# Patient Record
Sex: FEMALE | Race: WHITE | NOT HISPANIC OR LATINO | Employment: FULL TIME | URBAN - METROPOLITAN AREA
[De-identification: names, ages, dates, MRNs, and addresses within clinical notes are randomized per-mention and may not be internally consistent; named-entity substitution may affect disease eponyms.]

---

## 2017-04-28 ENCOUNTER — HISTORICAL (OUTPATIENT)
Dept: ADMINISTRATIVE | Facility: HOSPITAL | Age: 62
End: 2017-04-28

## 2017-06-02 RX ORDER — AMLODIPINE BESYLATE 2.5 MG/1
1 TABLET ORAL DAILY
COMMUNITY
Start: 2017-04-19 | End: 2017-09-18 | Stop reason: SDUPTHER

## 2017-06-02 RX ORDER — DICYCLOMINE HYDROCHLORIDE 20 MG/1
1 TABLET ORAL DAILY
COMMUNITY
End: 2018-02-06

## 2017-06-02 RX ORDER — UBIDECARENONE 60 MG
1 CAPSULE ORAL DAILY
COMMUNITY
End: 2019-05-21 | Stop reason: CLARIF

## 2017-06-02 RX ORDER — BISOPROLOL FUMARATE 10 MG/1
1 TABLET, FILM COATED ORAL DAILY
COMMUNITY
Start: 2017-04-19 | End: 2017-07-13 | Stop reason: SDUPTHER

## 2017-06-02 RX ORDER — METHOCARBAMOL 750 MG/1
1 TABLET, FILM COATED ORAL
Status: ON HOLD | COMMUNITY
End: 2017-08-22 | Stop reason: CLARIF

## 2017-06-02 RX ORDER — LEVOTHYROXINE SODIUM 112 UG/1
1 TABLET ORAL DAILY
COMMUNITY
Start: 2017-01-30 | End: 2017-07-13 | Stop reason: SDUPTHER

## 2017-06-05 ENCOUNTER — OFFICE VISIT (OUTPATIENT)
Dept: FAMILY MEDICINE | Facility: CLINIC | Age: 62
End: 2017-06-05
Payer: COMMERCIAL

## 2017-06-05 VITALS
WEIGHT: 148 LBS | DIASTOLIC BLOOD PRESSURE: 84 MMHG | SYSTOLIC BLOOD PRESSURE: 122 MMHG | OXYGEN SATURATION: 99 % | HEIGHT: 62 IN | HEART RATE: 56 BPM | BODY MASS INDEX: 27.23 KG/M2

## 2017-06-05 DIAGNOSIS — E03.9 ACQUIRED HYPOTHYROIDISM: ICD-10-CM

## 2017-06-05 DIAGNOSIS — M54.2 NECK PAIN: ICD-10-CM

## 2017-06-05 DIAGNOSIS — I10 BENIGN HYPERTENSION: Primary | ICD-10-CM

## 2017-06-05 PROBLEM — K58.9 IRRITABLE BOWEL SYNDROME: Status: ACTIVE | Noted: 2017-06-05

## 2017-06-05 PROBLEM — E78.2 MULTIPLE-TYPE HYPERLIPIDEMIA: Status: ACTIVE | Noted: 2017-06-05

## 2017-06-05 PROBLEM — J30.9 ALLERGIC RHINITIS: Status: ACTIVE | Noted: 2017-06-05

## 2017-06-05 PROBLEM — G47.00 INSOMNIA, PERSISTENT: Status: ACTIVE | Noted: 2017-06-05

## 2017-06-05 PROCEDURE — 99214 OFFICE O/P EST MOD 30 MIN: CPT | Mod: ,,, | Performed by: FAMILY MEDICINE

## 2017-06-05 NOTE — PROGRESS NOTES
Subjective:       Patient ID:  Yudith Haider is a 61 y.o. female with multiple medical diagnoses as listed in the medical history and problem list that presents for Hypertension (f/u) and Neck Pain (f/u)  .      Chief Complaint: Hypertension (f/u) and Neck Pain (f/u)    Hypertension   This is a chronic problem. The current episode started more than 1 year ago. The problem is unchanged. The problem is controlled. Associated symptoms include neck pain. Pertinent negatives include no chest pain, headaches or shortness of breath. Risk factors for coronary artery disease include diabetes mellitus. Past treatments include calcium channel blockers and beta blockers. The current treatment provides moderate improvement. There are no compliance problems.  There is no history of angina, PVD or retinopathy.   Neck Pain    This is a chronic problem. The current episode started more than 1 year ago. The problem occurs constantly. The problem has been gradually improving. The pain is associated with nothing. The pain is present in the midline. The pain is at a severity of 2/10. The pain is moderate. The symptoms are aggravated by twisting and position. Pertinent negatives include no chest pain, fever or headaches. She has tried NSAIDs and home exercises (physical therapy) for the symptoms.     Review of Systems   Constitutional: Negative for activity change, appetite change, chills and fever.   HENT: Negative.  Negative for facial swelling.    Eyes: Negative.  Negative for discharge.   Respiratory: Negative for apnea, cough and shortness of breath.    Cardiovascular: Negative.  Negative for chest pain.   Gastrointestinal: Negative for abdominal distention, diarrhea, nausea and vomiting.   Genitourinary: Negative for difficulty urinating and dyspareunia.   Musculoskeletal: Positive for neck pain. Negative for arthralgias (right keen and back pain. ), back pain and myalgias.   Skin: Negative for color change and rash.    Neurological: Negative.  Negative for dizziness and headaches.   Psychiatric/Behavioral: Negative for agitation and dysphoric mood.       Past Medical History:   Diagnosis Date    Hyperlipidemia     Hypertension     Hypothyroidism      Past Surgical History:   Procedure Laterality Date    HYSTERECTOMY  1981    THYROIDECTOMY  1993     Family History   Problem Relation Age of Onset    Hypertension Mother     Diabetes Mother     Stroke Father     Hypertension Father      Social History     Social History    Marital status:      Spouse name: N/A    Number of children: N/A    Years of education: N/A     Social History Main Topics    Smoking status: Former Smoker     Types: Cigarettes     Quit date: 1/1/2003    Smokeless tobacco: None    Alcohol use Yes      Comment: occassional    Drug use: No    Sexual activity: Not Asked     Other Topics Concern    None     Social History Narrative    None     Current Outpatient Prescriptions   Medication Sig Dispense Refill    amlodipine (NORVASC) 2.5 MG tablet Take 1 tablet by mouth Daily.      bisoprolol (ZEBETA) 10 MG tablet Take 1 tablet by mouth Daily.      COCONUT OIL ORAL Take 1 tablet by mouth Daily.      dicyclomine (BENTYL) 20 mg tablet Take 1 tablet by mouth Daily.      krill-om-3-dha-epa-phospho-ast (KRILL OIL) 1,006-012-26-50 mg Cap Take 1 tablet by mouth Daily.      levothyroxine (SYNTHROID) 112 MCG tablet Take 1 tablet by mouth Daily.      methocarbamol (ROBAXIN-750) 750 MG Tab Take 1 tablet by mouth as needed.       UBIDECARENONE (COENZYME Q10) 60 mg Cap Take 1 capsule by mouth Daily.       No current facility-administered medications for this visit.      Review of patient's allergies indicates:   Allergen Reactions    Lisinopril      Other reaction(s): Cough     Objective:      Vitals:    06/05/17 0915   BP: 122/84   Pulse: (!) 56     Physical Exam   Constitutional: She appears well-developed and well-nourished.   HENT:   Head:  Normocephalic.   Eyes: Pupils are equal, round, and reactive to light. Right eye exhibits no discharge. Left eye exhibits no discharge. No scleral icterus.   Neck: Muscular tenderness present. No thyromegaly present.   Cardiovascular: Normal rate and regular rhythm.  Exam reveals no gallop and no friction rub.    No murmur heard.  Pulmonary/Chest: No respiratory distress. She has no wheezes. She has no rales. She exhibits no tenderness.   Abdominal: She exhibits no distension. There is no tenderness.   Musculoskeletal: She exhibits no tenderness.   Skin: No erythema.   Psychiatric: She has a normal mood and affect.       Assessment:       1. Benign hypertension    2. Acquired hypothyroidism    3. Neck pain        Plan:       Benign hypertension  Advise pt to take low sodium, healthier diet,  exercise 150 min per week,   compliance with medication. Maintain the ideal body weight or  weight loss if overweight/ obesity.    Acquired hypothyroidism  Comments:  TSH wnl     Neck pain  Comments:  5/2017: continue PT.       Return in about 4 months (around 10/5/2017) for annual physical.      6/5/2017 Gila Brown M.D.

## 2017-06-20 DIAGNOSIS — M54.2 NECK PAIN: Primary | ICD-10-CM

## 2017-07-05 ENCOUNTER — OFFICE VISIT (OUTPATIENT)
Dept: PAIN MEDICINE | Facility: CLINIC | Age: 62
End: 2017-07-05
Payer: COMMERCIAL

## 2017-07-05 VITALS
DIASTOLIC BLOOD PRESSURE: 89 MMHG | WEIGHT: 148 LBS | HEIGHT: 62 IN | HEART RATE: 89 BPM | SYSTOLIC BLOOD PRESSURE: 138 MMHG | BODY MASS INDEX: 27.23 KG/M2

## 2017-07-05 DIAGNOSIS — M54.12 CERVICAL RADICULOPATHY: Primary | ICD-10-CM

## 2017-07-05 DIAGNOSIS — M50.30 DDD (DEGENERATIVE DISC DISEASE), CERVICAL: Primary | ICD-10-CM

## 2017-07-05 DIAGNOSIS — M47.812 OSTEOARTHRITIS OF CERVICAL SPINE, UNSPECIFIED SPINAL OSTEOARTHRITIS COMPLICATION STATUS: ICD-10-CM

## 2017-07-05 PROCEDURE — 99999 PR PBB SHADOW E&M-NEW PATIENT-LVL III: CPT | Mod: PBBFAC,,, | Performed by: ANESTHESIOLOGY

## 2017-07-05 PROCEDURE — 99244 OFF/OP CNSLTJ NEW/EST MOD 40: CPT | Mod: S$GLB,,, | Performed by: ANESTHESIOLOGY

## 2017-07-05 NOTE — LETTER
July 5, 2017      Gila Brown MD  [address]  [place] [address]           [place] [facility]  [address] [place] [address]  [address]  [place] [address]  Phone: [phone]          Patient: Yudith Haider   MR Number: [id number]   YOB: 1955   Date of Visit: 7/5/2017       Dear Dr. Gila Brown:    Thank you for referring Yudith Haider to me for evaluation. Attached you will find relevant portions of my assessment and plan of care.    If you have questions, please do not hesitate to call me. I look forward to following Yudith Haider along with you.    Sincerely,    Jose Bennett MD    Enclosure  CC:  No Recipients    If you would like to receive this communication electronically, please contact [email or web address] or [phone] to request more information on [facility] access.    For providers and/or their staff who would like to refer a patient to [facility], please contact us through our one-stop-shop provider referral line, [facility], at [phone].    If you feel you have received this communication in error or would no longer like to receive these types of communications, please e-mail [email or web address]
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
## 2017-07-06 ENCOUNTER — DOCUMENTATION ONLY (OUTPATIENT)
Dept: PAIN MEDICINE | Facility: CLINIC | Age: 62
End: 2017-07-06

## 2017-07-06 NOTE — PROGRESS NOTES
(Open sided MRI) 3/23/2017    MRI of the cervical spine   Reversal of usual cervical lordosis  Multilevel mild to moderate facet arthropathy.  C3-4 through C6-7 level degenerative cervical disc disease/spondylosis.  Most advanced at C5-6 and C6-7.  There is mild central canal stenosis and mild foraminal narrowing at those levels.

## 2017-07-13 RX ORDER — BISOPROLOL FUMARATE 10 MG/1
TABLET, FILM COATED ORAL
Qty: 90 TABLET | Refills: 1 | Status: SHIPPED | OUTPATIENT
Start: 2017-07-13 | End: 2017-09-18 | Stop reason: SDUPTHER

## 2017-07-13 RX ORDER — LEVOTHYROXINE SODIUM 112 UG/1
TABLET ORAL
Qty: 90 TABLET | Refills: 1 | Status: SHIPPED | OUTPATIENT
Start: 2017-07-13 | End: 2017-09-18 | Stop reason: SDUPTHER

## 2017-07-14 ENCOUNTER — SURGERY (OUTPATIENT)
Age: 62
End: 2017-07-14

## 2017-07-14 ENCOUNTER — HOSPITAL ENCOUNTER (OUTPATIENT)
Facility: AMBULARY SURGERY CENTER | Age: 62
Discharge: HOME | End: 2017-07-14
Attending: ANESTHESIOLOGY | Admitting: ANESTHESIOLOGY
Payer: COMMERCIAL

## 2017-07-14 DIAGNOSIS — M50.30 DDD (DEGENERATIVE DISC DISEASE), CERVICAL: Primary | ICD-10-CM

## 2017-07-14 PROCEDURE — 62321 NJX INTERLAMINAR CRV/THRC: CPT | Performed by: ANESTHESIOLOGY

## 2017-07-14 PROCEDURE — 62321 NJX INTERLAMINAR CRV/THRC: CPT | Mod: ,,, | Performed by: ANESTHESIOLOGY

## 2017-07-14 PROCEDURE — 99152 MOD SED SAME PHYS/QHP 5/>YRS: CPT | Mod: ,,, | Performed by: ANESTHESIOLOGY

## 2017-07-14 RX ORDER — MIDAZOLAM HYDROCHLORIDE 2 MG/2ML
INJECTION, SOLUTION INTRAMUSCULAR; INTRAVENOUS
Status: DISCONTINUED | OUTPATIENT
Start: 2017-07-14 | End: 2017-07-14 | Stop reason: HOSPADM

## 2017-07-14 RX ORDER — SODIUM CHLORIDE 9 MG/ML
INJECTION, SOLUTION INTRAMUSCULAR; INTRAVENOUS; SUBCUTANEOUS
Status: DISCONTINUED | OUTPATIENT
Start: 2017-07-14 | End: 2017-07-14 | Stop reason: HOSPADM

## 2017-07-14 RX ORDER — FENTANYL CITRATE 50 UG/ML
INJECTION, SOLUTION INTRAMUSCULAR; INTRAVENOUS
Status: DISCONTINUED | OUTPATIENT
Start: 2017-07-14 | End: 2017-07-14 | Stop reason: HOSPADM

## 2017-07-14 RX ORDER — FENTANYL CITRATE 50 UG/ML
INJECTION, SOLUTION INTRAMUSCULAR; INTRAVENOUS
Status: DISCONTINUED
Start: 2017-07-14 | End: 2017-07-14 | Stop reason: HOSPADM

## 2017-07-14 RX ORDER — LIDOCAINE HYDROCHLORIDE 10 MG/ML
INJECTION, SOLUTION EPIDURAL; INFILTRATION; INTRACAUDAL; PERINEURAL
Status: DISCONTINUED | OUTPATIENT
Start: 2017-07-14 | End: 2017-07-14 | Stop reason: HOSPADM

## 2017-07-14 RX ORDER — ALPRAZOLAM 1 MG/1
1 TABLET, ORALLY DISINTEGRATING ORAL ONCE AS NEEDED
Status: DISCONTINUED | OUTPATIENT
Start: 2017-07-14 | End: 2017-07-14 | Stop reason: HOSPADM

## 2017-07-14 RX ORDER — DEXAMETHASONE SODIUM PHOSPHATE 10 MG/ML
INJECTION INTRAMUSCULAR; INTRAVENOUS
Status: DISCONTINUED | OUTPATIENT
Start: 2017-07-14 | End: 2017-07-14 | Stop reason: HOSPADM

## 2017-07-14 RX ORDER — SODIUM CHLORIDE, SODIUM LACTATE, POTASSIUM CHLORIDE, CALCIUM CHLORIDE 600; 310; 30; 20 MG/100ML; MG/100ML; MG/100ML; MG/100ML
INJECTION, SOLUTION INTRAVENOUS CONTINUOUS
Status: DISCONTINUED | OUTPATIENT
Start: 2017-07-14 | End: 2017-07-14 | Stop reason: HOSPADM

## 2017-07-14 RX ORDER — MIDAZOLAM HYDROCHLORIDE 1 MG/ML
INJECTION INTRAMUSCULAR; INTRAVENOUS
Status: DISCONTINUED
Start: 2017-07-14 | End: 2017-07-14 | Stop reason: HOSPADM

## 2017-07-14 RX ORDER — DEXAMETHASONE SODIUM PHOSPHATE 10 MG/ML
INJECTION INTRAMUSCULAR; INTRAVENOUS
Status: DISCONTINUED
Start: 2017-07-14 | End: 2017-07-14 | Stop reason: HOSPADM

## 2017-07-14 RX ORDER — LIDOCAINE HYDROCHLORIDE 10 MG/ML
INJECTION, SOLUTION EPIDURAL; INFILTRATION; INTRACAUDAL; PERINEURAL
Status: DISCONTINUED
Start: 2017-07-14 | End: 2017-07-14 | Stop reason: HOSPADM

## 2017-07-14 RX ADMIN — MIDAZOLAM HYDROCHLORIDE 2 MG: 2 INJECTION, SOLUTION INTRAMUSCULAR; INTRAVENOUS at 12:07

## 2017-07-14 RX ADMIN — DEXAMETHASONE SODIUM PHOSPHATE 10 MG: 10 INJECTION INTRAMUSCULAR; INTRAVENOUS at 12:07

## 2017-07-14 RX ADMIN — FENTANYL CITRATE 75 MCG: 50 INJECTION, SOLUTION INTRAMUSCULAR; INTRAVENOUS at 12:07

## 2017-07-14 RX ADMIN — SODIUM CHLORIDE, SODIUM LACTATE, POTASSIUM CHLORIDE, CALCIUM CHLORIDE: 600; 310; 30; 20 INJECTION, SOLUTION INTRAVENOUS at 12:07

## 2017-07-14 RX ADMIN — SODIUM CHLORIDE 4 ML: 9 INJECTION, SOLUTION INTRAMUSCULAR; INTRAVENOUS; SUBCUTANEOUS at 12:07

## 2017-07-14 RX ADMIN — LIDOCAINE HYDROCHLORIDE 10 ML: 10 INJECTION, SOLUTION EPIDURAL; INFILTRATION; INTRACAUDAL; PERINEURAL at 12:07

## 2017-07-14 NOTE — DISCHARGE SUMMARY
[facility]  Discharge Note  Short Stay    Admit Date: 7/14/2017    Discharge Date and Time: 7/14/2017    Attending Physician: Jose Bennett MD     Discharge Provider: Jose Bennett    Diagnoses:  Active Hospital Problems    Diagnosis  POA    *DDD (degenerative disc disease), cervical [M50.30]  Yes      Resolved Hospital Problems    Diagnosis Date Resolved POA   No resolved problems to display.       Hospital Course: Cervical FANG  Discharged Condition: Good    Final Diagnoses:   Active Hospital Problems    Diagnosis  POA    *DDD (degenerative disc disease), cervical [M50.30]  Yes      Resolved Hospital Problems    Diagnosis Date Resolved POA   No resolved problems to display.       Disposition: Home or Self Care    Follow up/Patient Instructions:    Medications:  Reconciled Home Medications:   Current Discharge Medication List      CONTINUE these medications which have NOT CHANGED    Details   amlodipine (NORVASC) 2.5 MG tablet Take 1 tablet by mouth Daily.      bisoprolol (ZEBETA) 10 MG tablet take 1 tablet by mouth once daily  Qty: 90 tablet, Refills: 1      levothyroxine (SYNTHROID) 112 MCG tablet take 1 tablet by mouth once daily  Qty: 90 tablet, Refills: 1      COCONUT OIL ORAL Take 1 tablet by mouth Daily.      dicyclomine (BENTYL) 20 mg tablet Take 1 tablet by mouth Daily.      krill-om-3-dha-epa-phospho-ast (KRILL OIL) 1,467-339-58-50 mg Cap Take 1 tablet by mouth Daily.      methocarbamol (ROBAXIN-750) 750 MG Tab Take 1 tablet by mouth as needed.       UBIDECARENONE (COENZYME Q10) 60 mg Cap Take 1 capsule by mouth Daily.             Discharge Procedure Orders  Diet general     Activity as tolerated     Call MD for:  temperature >100.4     Call MD for:  persistent nausea and vomiting or diarrhea     Call MD for:  severe uncontrolled pain     Call MD for:  redness, tenderness, or signs of infection (pain, swelling, redness, odor or green/yellow discharge around incision site)     Call MD for:   difficulty breathing or increased cough     Call MD for:  severe persistent headache          Follow up with MD in 2-3 weeks    Discharge Procedure Orders (must include Diet, Follow-up, Activity):    Discharge Procedure Orders (must include Diet, Follow-up, Activity)  Diet general     Activity as tolerated     Call MD for:  temperature >100.4     Call MD for:  persistent nausea and vomiting or diarrhea     Call MD for:  severe uncontrolled pain     Call MD for:  redness, tenderness, or signs of infection (pain, swelling, redness, odor or green/yellow discharge around incision site)     Call MD for:  difficulty breathing or increased cough     Call MD for:  severe persistent headache

## 2017-07-14 NOTE — PLAN OF CARE
Stable states ready to go home, jarod po fluids, denies pain, ambulatory to car with RN and father

## 2017-07-14 NOTE — OP NOTE
PROCEDURE DATE: 7/14/2017    Procedure: C7-T1 cervical interlaminar epidural steroid injection under utilizing fluoroscopy.    Diagnosis: Cervical DISC DEGENERATION WITHOUT MYELOPATHY  POSTOP DIAGNOSIS: SAME    Physician: Jose Bennett MD    Medications injected:  Dexamethasone 10mg followed by a slow injection of 4 mL sterile, preservative-free normal saline.    Local anesthetic used: Lidocaine 1%, 2 ml.    Sedation Medications: RN IV sedation    Complications:  None    Estimated blood loss: None    Technique:  A time-out was taken to identify patient and procedure prior to starting the procedure.  With the patient laying in a prone position with the neck in a mid-flexed forward position, the area was prepped and draped in the usual sterile fashion using ChloraPrep and a fenestrated drape.  The area was determined under AP fluoroscopic guidance.  Local anesthetic was given using a 25-gauge 1.5 inch needle by raising a wheal and then infiltrating ventrally.  A 3.5 inch 20-gauge Touhy needle was introduced under fluoroscopic guidance to meet the lamina of C7.  The needle was then hinged under the lamina then advanced using loss of resistance technique.  Once the tip of the needle was in the desired position, the 1ml contrast dye Omnipaque was injected to determine placement and no uptake.  The steroid was then injected slowly followed by a slow injection of 4 mL of the sterile preservative-free normal saline.  The patient tolerated the procedure well.    The patient was monitored after the procedure and was given post-procedure and discharge instructions to follow at home. The patient was discharged in a stable condition.

## 2017-07-17 VITALS
SYSTOLIC BLOOD PRESSURE: 156 MMHG | TEMPERATURE: 98 F | WEIGHT: 147.94 LBS | BODY MASS INDEX: 27.22 KG/M2 | HEIGHT: 62 IN | RESPIRATION RATE: 20 BRPM | OXYGEN SATURATION: 100 % | HEART RATE: 64 BPM | DIASTOLIC BLOOD PRESSURE: 77 MMHG

## 2017-08-08 ENCOUNTER — OFFICE VISIT (OUTPATIENT)
Dept: PAIN MEDICINE | Facility: CLINIC | Age: 62
End: 2017-08-08
Payer: COMMERCIAL

## 2017-08-08 VITALS
HEART RATE: 74 BPM | SYSTOLIC BLOOD PRESSURE: 141 MMHG | WEIGHT: 147 LBS | BODY MASS INDEX: 27.05 KG/M2 | HEIGHT: 62 IN | DIASTOLIC BLOOD PRESSURE: 84 MMHG

## 2017-08-08 DIAGNOSIS — M47.812 OSTEOARTHRITIS OF CERVICAL SPINE, UNSPECIFIED SPINAL OSTEOARTHRITIS COMPLICATION STATUS: ICD-10-CM

## 2017-08-08 DIAGNOSIS — M54.12 CERVICAL RADICULOPATHY: Primary | ICD-10-CM

## 2017-08-08 DIAGNOSIS — M50.30 DDD (DEGENERATIVE DISC DISEASE), CERVICAL: ICD-10-CM

## 2017-08-08 PROCEDURE — 99214 OFFICE O/P EST MOD 30 MIN: CPT | Mod: S$GLB,,, | Performed by: PHYSICIAN ASSISTANT

## 2017-08-08 PROCEDURE — 3008F BODY MASS INDEX DOCD: CPT | Mod: S$GLB,,, | Performed by: PHYSICIAN ASSISTANT

## 2017-08-08 PROCEDURE — 99999 PR PBB SHADOW E&M-EST. PATIENT-LVL III: CPT | Mod: PBBFAC,,, | Performed by: PHYSICIAN ASSISTANT

## 2017-08-08 PROCEDURE — 3077F SYST BP >= 140 MM HG: CPT | Mod: S$GLB,,, | Performed by: PHYSICIAN ASSISTANT

## 2017-08-08 PROCEDURE — 3079F DIAST BP 80-89 MM HG: CPT | Mod: S$GLB,,, | Performed by: PHYSICIAN ASSISTANT
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
## 2017-08-15 DIAGNOSIS — M54.12 CERVICAL RADICULOPATHY: Primary | ICD-10-CM

## 2017-08-22 ENCOUNTER — HOSPITAL ENCOUNTER (OUTPATIENT)
Facility: AMBULARY SURGERY CENTER | Age: 62
Discharge: HOME | End: 2017-08-22
Attending: ANESTHESIOLOGY | Admitting: ANESTHESIOLOGY
Payer: COMMERCIAL

## 2017-08-22 ENCOUNTER — SURGERY (OUTPATIENT)
Age: 62
End: 2017-08-22

## 2017-08-22 DIAGNOSIS — M50.30 DDD (DEGENERATIVE DISC DISEASE), CERVICAL: Primary | ICD-10-CM

## 2017-08-22 PROCEDURE — 99152 MOD SED SAME PHYS/QHP 5/>YRS: CPT | Mod: ,,, | Performed by: ANESTHESIOLOGY

## 2017-08-22 PROCEDURE — 62321 NJX INTERLAMINAR CRV/THRC: CPT | Mod: ,,, | Performed by: ANESTHESIOLOGY

## 2017-08-22 PROCEDURE — 62321 NJX INTERLAMINAR CRV/THRC: CPT | Performed by: ANESTHESIOLOGY

## 2017-08-22 RX ORDER — SODIUM CHLORIDE 9 MG/ML
INJECTION, SOLUTION INTRAMUSCULAR; INTRAVENOUS; SUBCUTANEOUS
Status: DISCONTINUED | OUTPATIENT
Start: 2017-08-22 | End: 2017-08-28 | Stop reason: HOSPADM

## 2017-08-22 RX ORDER — LIDOCAINE HYDROCHLORIDE 10 MG/ML
INJECTION, SOLUTION EPIDURAL; INFILTRATION; INTRACAUDAL; PERINEURAL
Status: DISCONTINUED | OUTPATIENT
Start: 2017-08-22 | End: 2017-08-28 | Stop reason: HOSPADM

## 2017-08-22 RX ORDER — ALPRAZOLAM 1 MG/1
1 TABLET, ORALLY DISINTEGRATING ORAL ONCE AS NEEDED
Status: ACTIVE | OUTPATIENT
Start: 2017-08-22 | End: 2017-08-22

## 2017-08-22 RX ORDER — SODIUM CHLORIDE, SODIUM LACTATE, POTASSIUM CHLORIDE, CALCIUM CHLORIDE 600; 310; 30; 20 MG/100ML; MG/100ML; MG/100ML; MG/100ML
INJECTION, SOLUTION INTRAVENOUS CONTINUOUS
Status: DISCONTINUED | OUTPATIENT
Start: 2017-08-22 | End: 2017-08-28 | Stop reason: HOSPADM

## 2017-08-22 RX ORDER — DEXAMETHASONE SODIUM PHOSPHATE 10 MG/ML
INJECTION INTRAMUSCULAR; INTRAVENOUS
Status: DISPENSED
Start: 2017-08-22 | End: 2017-08-23

## 2017-08-22 RX ORDER — DEXAMETHASONE SODIUM PHOSPHATE 10 MG/ML
INJECTION INTRAMUSCULAR; INTRAVENOUS
Status: DISCONTINUED | OUTPATIENT
Start: 2017-08-22 | End: 2017-08-28 | Stop reason: HOSPADM

## 2017-08-22 RX ORDER — FENTANYL CITRATE 50 UG/ML
INJECTION, SOLUTION INTRAMUSCULAR; INTRAVENOUS
Status: DISCONTINUED | OUTPATIENT
Start: 2017-08-22 | End: 2017-08-28 | Stop reason: HOSPADM

## 2017-08-22 RX ORDER — LIDOCAINE HYDROCHLORIDE 10 MG/ML
INJECTION, SOLUTION EPIDURAL; INFILTRATION; INTRACAUDAL; PERINEURAL
Status: DISPENSED
Start: 2017-08-22 | End: 2017-08-23

## 2017-08-22 RX ORDER — FENTANYL CITRATE 50 UG/ML
INJECTION, SOLUTION INTRAMUSCULAR; INTRAVENOUS
Status: DISPENSED
Start: 2017-08-22 | End: 2017-08-23

## 2017-08-22 RX ORDER — MIDAZOLAM HYDROCHLORIDE 2 MG/2ML
INJECTION, SOLUTION INTRAMUSCULAR; INTRAVENOUS
Status: DISCONTINUED | OUTPATIENT
Start: 2017-08-22 | End: 2017-08-28 | Stop reason: HOSPADM

## 2017-08-22 RX ORDER — MIDAZOLAM HYDROCHLORIDE 1 MG/ML
INJECTION INTRAMUSCULAR; INTRAVENOUS
Status: DISPENSED
Start: 2017-08-22 | End: 2017-08-23

## 2017-08-22 RX ADMIN — LIDOCAINE HYDROCHLORIDE 5 ML: 10 INJECTION, SOLUTION EPIDURAL; INFILTRATION; INTRACAUDAL; PERINEURAL at 02:08

## 2017-08-22 RX ADMIN — MIDAZOLAM HYDROCHLORIDE 2 MG: 2 INJECTION, SOLUTION INTRAMUSCULAR; INTRAVENOUS at 02:08

## 2017-08-22 RX ADMIN — FENTANYL CITRATE 50 MCG: 50 INJECTION, SOLUTION INTRAMUSCULAR; INTRAVENOUS at 02:08

## 2017-08-22 RX ADMIN — SODIUM CHLORIDE, SODIUM LACTATE, POTASSIUM CHLORIDE, CALCIUM CHLORIDE: 600; 310; 30; 20 INJECTION, SOLUTION INTRAVENOUS at 02:08

## 2017-08-22 RX ADMIN — DEXAMETHASONE SODIUM PHOSPHATE 10 MG: 10 INJECTION INTRAMUSCULAR; INTRAVENOUS at 02:08

## 2017-08-22 RX ADMIN — SODIUM CHLORIDE 4 ML: 9 INJECTION, SOLUTION INTRAMUSCULAR; INTRAVENOUS; SUBCUTANEOUS at 02:08

## 2017-08-22 NOTE — DISCHARGE SUMMARY
[facility]  Discharge Note  Short Stay    Admit Date: 8/22/2017    Discharge Date and Time: 8/22/2017    Attending Physician: Jose Bennett MD     Discharge Provider: Jose Bennett    Diagnoses:  Active Hospital Problems    Diagnosis  POA    *DDD (degenerative disc disease), cervical [M50.30]  Yes      Resolved Hospital Problems    Diagnosis Date Resolved POA   No resolved problems to display.       Hospital Course: Cervical Willian  Discharged Condition: Good    Final Diagnoses:   Active Hospital Problems    Diagnosis  POA    *DDD (degenerative disc disease), cervical [M50.30]  Yes      Resolved Hospital Problems    Diagnosis Date Resolved POA   No resolved problems to display.       Disposition: Home or Self Care    Follow up/Patient Instructions:    Medications:  Reconciled Home Medications:   Current Discharge Medication List      CONTINUE these medications which have NOT CHANGED    Details   amlodipine (NORVASC) 2.5 MG tablet Take 1 tablet by mouth Daily.      bisoprolol (ZEBETA) 10 MG tablet take 1 tablet by mouth once daily  Qty: 90 tablet, Refills: 1      COCONUT OIL ORAL Take 1 tablet by mouth Daily.      levothyroxine (SYNTHROID) 112 MCG tablet take 1 tablet by mouth once daily  Qty: 90 tablet, Refills: 1      dicyclomine (BENTYL) 20 mg tablet Take 1 tablet by mouth Daily.      UBIDECARENONE (COENZYME Q10) 60 mg Cap Take 1 capsule by mouth Daily.             Discharge Procedure Orders  Diet general     Activity as tolerated     Call MD for:  temperature >100.4     Call MD for:  persistent nausea and vomiting or diarrhea     Call MD for:  severe uncontrolled pain     Call MD for:  redness, tenderness, or signs of infection (pain, swelling, redness, odor or green/yellow discharge around incision site)     Call MD for:  difficulty breathing or increased cough     Call MD for:  severe persistent headache          Follow up with MD in 2-3 weeks    Discharge Procedure Orders (must include Diet, Follow-up,  Activity):    Discharge Procedure Orders (must include Diet, Follow-up, Activity)  Diet general     Activity as tolerated     Call MD for:  temperature >100.4     Call MD for:  persistent nausea and vomiting or diarrhea     Call MD for:  severe uncontrolled pain     Call MD for:  redness, tenderness, or signs of infection (pain, swelling, redness, odor or green/yellow discharge around incision site)     Call MD for:  difficulty breathing or increased cough     Call MD for:  severe persistent headache

## 2017-08-22 NOTE — OP NOTE
PROCEDURE DATE: 8/22/2017    Procedure: C7-T1 cervical interlaminar epidural steroid injection under utilizing fluoroscopy.    Diagnosis: Cervical DISC DEGENERATION WITHOUT MYELOPATHY  POSTOP DIAGNOSIS: SAME    Physician: Jose Bennett MD    Medications injected:  Dexamethasone 10mg followed by a slow injection of 4 mL sterile, preservative-free normal saline.    Local anesthetic used: Lidocaine 1%, 2 ml.    Sedation Medications: RN IV sedation    Complications:  None    Estimated blood loss: None    Technique:  A time-out was taken to identify patient and procedure prior to starting the procedure.  With the patient laying in a prone position with the neck in a mid-flexed forward position, the area was prepped and draped in the usual sterile fashion using ChloraPrep and a fenestrated drape.  The area was determined under AP fluoroscopic guidance.  Local anesthetic was given using a 25-gauge 1.5 inch needle by raising a wheal and then infiltrating ventrally.  A 3.5 inch 20-gauge Touhy needle was introduced under fluoroscopic guidance to meet the lamina of C7.  The needle was then hinged under the lamina then advanced using loss of resistance technique.  Once the tip of the needle was in the desired position, the 1ml contrast dye Omnipaque was injected to determine placement and no uptake.  The steroid was then injected slowly followed by a slow injection of 4 mL of the sterile preservative-free normal saline.  The patient tolerated the procedure well.    The patient was monitored after the procedure and was given post-procedure and discharge instructions to follow at home. The patient was discharged in a stable condition.

## 2017-08-22 NOTE — DISCHARGE INSTRUCTIONS
Pain injection instructions:     Steroids take about a week to relieve pain.  Initially you may get pain relief from the local anesthetic but this may wear off before the steroid works.    No driving for 24 hrs   Activity as tolerated- gradually increase activities.  Dont lift over 10 lbs for 24 hrs   No heat at injection sites x 2 days  Use ice for mild swelling and for comfort.  May shower today. No baths for two days.      Resume Aspirin, Plavix, or Coumadin the day after the procedure unless otherwise instructed.   If diabetic,monitor your glucose carefully as steroids can increase glucose level    Seek immediate medical help for:   Severe increase in your usual pain or appearance of new pain.  Prolonged or increasing weakness or numbness in the legs or arms.   Numbing medicine was injected that affects nerves that carry information from your arm muscles to your  brain and from your brain to your arm muscles.  This numbness can last 4-6 hrs so only get up with assistance if arm numbness present.     Fever above 101 ,Drainage,redness,active bleeding, or increased swelling at the injection site.  Headache, shortness of breath, chest pain, or breathing problems.

## 2017-08-25 VITALS
DIASTOLIC BLOOD PRESSURE: 92 MMHG | BODY MASS INDEX: 27.05 KG/M2 | HEART RATE: 62 BPM | SYSTOLIC BLOOD PRESSURE: 154 MMHG | TEMPERATURE: 98 F | HEIGHT: 62 IN | WEIGHT: 147 LBS | RESPIRATION RATE: 19 BRPM | OXYGEN SATURATION: 97 %

## 2017-09-12 ENCOUNTER — OFFICE VISIT (OUTPATIENT)
Dept: PAIN MEDICINE | Facility: CLINIC | Age: 62
End: 2017-09-12
Payer: COMMERCIAL

## 2017-09-12 VITALS
HEIGHT: 62 IN | DIASTOLIC BLOOD PRESSURE: 87 MMHG | WEIGHT: 147 LBS | SYSTOLIC BLOOD PRESSURE: 136 MMHG | BODY MASS INDEX: 27.05 KG/M2 | HEART RATE: 72 BPM

## 2017-09-12 DIAGNOSIS — M79.18 MYOFASCIAL PAIN: Primary | ICD-10-CM

## 2017-09-12 DIAGNOSIS — M54.12 CERVICAL RADICULOPATHY: ICD-10-CM

## 2017-09-12 DIAGNOSIS — M47.812 OSTEOARTHRITIS OF CERVICAL SPINE, UNSPECIFIED SPINAL OSTEOARTHRITIS COMPLICATION STATUS: ICD-10-CM

## 2017-09-12 DIAGNOSIS — M50.30 DDD (DEGENERATIVE DISC DISEASE), CERVICAL: ICD-10-CM

## 2017-09-12 PROCEDURE — 3008F BODY MASS INDEX DOCD: CPT | Mod: S$GLB,,, | Performed by: PHYSICIAN ASSISTANT

## 2017-09-12 PROCEDURE — 3075F SYST BP GE 130 - 139MM HG: CPT | Mod: S$GLB,,, | Performed by: PHYSICIAN ASSISTANT

## 2017-09-12 PROCEDURE — 99999 PR PBB SHADOW E&M-EST. PATIENT-LVL III: CPT | Mod: PBBFAC,,, | Performed by: PHYSICIAN ASSISTANT

## 2017-09-12 PROCEDURE — 3079F DIAST BP 80-89 MM HG: CPT | Mod: S$GLB,,, | Performed by: PHYSICIAN ASSISTANT

## 2017-09-12 PROCEDURE — 99213 OFFICE O/P EST LOW 20 MIN: CPT | Mod: S$GLB,,, | Performed by: PHYSICIAN ASSISTANT

## 2017-09-12 RX ORDER — BACLOFEN 10 MG/1
10 TABLET ORAL 3 TIMES DAILY PRN
Qty: 90 TABLET | Refills: 0 | Status: SHIPPED | OUTPATIENT
Start: 2017-09-12 | End: 2017-09-18
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
## 2017-09-12 NOTE — PATIENT INSTRUCTIONS
Exercises at Your Workstation: Eyes, Neck, and Head  Breathe deeply as you do your exercises. Inhale through your nose, and exhale through your mouth.   Tired eyes? Stiff neck? A few easy moves can help prevent these kinds of problems. Take a few minutes during your day to do these exercises--right at your desk. They'll loosen up your muscles, keep you more alert, and make a big difference in how you work and feel.    For Your Eyes  Eye Cup  · Lean forward with your elbows on your desk.  · Cup your hands and place them lightly over your closed eyes. Hold for a minute, while breathing deeply in and out.  · Slowly uncover and open your eyes. Repeat 2 times.  Eye Roll  · Close your eyes. Slowly roll your eyeballs clockwise all the way around. Repeat 3 times.  · Now slowly roll them all the way around counterclockwise. Repeat 3 times.  Eye Rest  · Every 20 minutes, look away from the computer screen. Focus on an object at least 20 feet away. Stay focused on this object for a full 20 seconds.    For Your Neck and Head  Warm-up  · Drop your head gently to your chest. While breathing in, slowly roll your head up to your left shoulder. While breathing out, slowly roll your head back to center. Repeat to the right.  · Repeat 3 times on each side.  Head Tilt  · Sit up straight. Tuck in your chin.  · Slowly tip your head to the left. Return to the center. Then, tip your head to the right.  · Repeat 3 times on each side.  If you feel pain while performing these stretching exercises, please stop and consult your doctor.   Head Turn  · Sit up straight.  · Slowly turn your head and look over your left shoulder. Hold for a few seconds. Go back to the center, then repeat to your right.  · Repeat 3 times on each side.    Neck Exercises: Active Neck Rotation    To start, lie on your back, knees bent and feet flat on the floor. Keep your ears, shoulders, and hips aligned, but dont press your lower back to the floor. Rest your  hands on your pelvis. Breathe deeply and relax.    · Use your neck muscles to turn your head to one side until you feel a stretch in the muscles.  · Hold for 5 seconds. Then turn to the other side.  · Repeat 5 times on each side.  Note: Keep your shoulders on the floor. Dont lift or tuck your chin as you turn your head.      Neck Exercises: Arm Lift            To start, lie on your back, knees bent and feet flat on the floor. Keep your ears, shoulders, and hips aligned, but dont press your lower back to the floor. Rest your hands on your pelvis. Breathe deeply and relax.  · Raise one arm overhead, then lower it. As you lower that arm, raise the other arm.  · Continue to move both arms in slow, smooth arcs. Keep your arms straight and your head and neck relaxed.  · Repeat 10 times with each arm.  For your safety, check with your healthcare provider before starting an exercise program.     Neck Exercises: Head Lifts     Do this exercise on your hands and knees. Keep your knees under your hips and your hands under your shoulders. Keep your spine in a neutral position (not arched or sagging). Keep your ears in line with your shoulders. Hold for a few seconds before starting the exercise.  · Keeping your back straight, slowly drop your chin toward your chest. Tuck in your chin.  · Hold for 5 seconds. Then lift your head until your neck is level with your back.  · Hold for 5 seconds. Repeat 5 times.      Neck Exercises: Neck Flex      To start, sit in a chair with your feet flat on the floor. Your weight should be slightly forward so that youre balanced evenly on your buttocks. Relax your shoulders and keep your head level. Avoid arching your back or rounding your shoulders. Using a chair with arms may help you keep your balance.  · Rest the back of your left hand against your lower back. Place your right palm on the top of your head.  · Gently pull your head forward and down until you feel a stretch in the neck  muscles. Dont force the motion.  · Hold for 20 seconds, then return to starting position. Switch arms.  © 2487-2522 [employer]. [address] [place] [address], [place] [address]. All rights reserved. This information is not intended as a substitute for professional medical care. Always follow your healthcare professional's instructions.          Exercises: Neck Isometrics  To start, sit in a chair with your feet flat on the floor. Your weight should be slightly forward so that youre balanced evenly on your buttocks. Relax your shoulders and keep your head level. Using a chair with arms may help you keep your balance.  1. Press your palm against your forehead. Resist with your neck muscles. Hold for 10 seconds. Relax. Repeat 5 times.  2. Do the exercise again, pressing on the side of your head. Repeat 5 times. Switch sides.  3. Do the exercise again, pressing on the back of your head. Repeat 5 times.       For your safety, check with your healthcare provider before starting an exercise program.       Neck Exercises: Passive Neck Rotation        To start, lie on your back, knees bent and feet flat on the floor. Keep your ears, shoulders, and hips aligned, but dont press your lower back to the floor. Rest your hands on your pelvis. Breathe deeply and relax.  · With your neck relaxed, place the palm of one hand on your forehead. Use your hand to turn your head to one side until you feel a stretch in the neck muscles. Do not push through pain.  · Hold for 5 seconds. Then turn to the other side.  · Repeat 5 times on each side.   Note: Keep your shoulders on the floor. Dont lift your chin as you turn your head.    Neck Exercises: Neck Glide  To start, lie on your back, knees bent and feet flat on the floor. Keep your ears, shoulders, and hips aligned. Dont press your lower back to the floor. Rest your hands on your pelvis. Breathe deeply and relax.  · Gently flatten the curve of your neck against the  floor.  · Lengthen your neck as though youre growing taller. Dont jam your chin into your chest, and dont push too hard.  · Hold for 5 seconds. Release. Repeat 3 times.      © 5847-0674 [employer]. [address] [place] [address], [place] [address]. All rights reserved. This information is not intended as a substitute for professional medical care. Always follow your healthcare professional's instructions.

## 2017-09-18 ENCOUNTER — OFFICE VISIT (OUTPATIENT)
Dept: FAMILY MEDICINE | Facility: CLINIC | Age: 62
End: 2017-09-18
Payer: COMMERCIAL

## 2017-09-18 VITALS
RESPIRATION RATE: 18 BRPM | HEIGHT: 62 IN | HEART RATE: 72 BPM | WEIGHT: 152.31 LBS | TEMPERATURE: 98 F | DIASTOLIC BLOOD PRESSURE: 78 MMHG | OXYGEN SATURATION: 98 % | SYSTOLIC BLOOD PRESSURE: 122 MMHG | BODY MASS INDEX: 28.03 KG/M2

## 2017-09-18 DIAGNOSIS — E03.9 ACQUIRED HYPOTHYROIDISM: ICD-10-CM

## 2017-09-18 DIAGNOSIS — Z00.00 ANNUAL PHYSICAL EXAM: Primary | ICD-10-CM

## 2017-09-18 DIAGNOSIS — I10 BENIGN HYPERTENSION: ICD-10-CM

## 2017-09-18 PROCEDURE — 99396 PREV VISIT EST AGE 40-64: CPT | Mod: ,,, | Performed by: NURSE PRACTITIONER

## 2017-09-18 RX ORDER — BISOPROLOL FUMARATE 10 MG/1
10 TABLET, FILM COATED ORAL DAILY
Qty: 90 TABLET | Refills: 1 | Status: SHIPPED | OUTPATIENT
Start: 2017-09-18 | End: 2018-03-09 | Stop reason: SDUPTHER

## 2017-09-18 RX ORDER — AMLODIPINE BESYLATE 2.5 MG/1
2.5 TABLET ORAL DAILY
Qty: 90 TABLET | Refills: 1 | Status: SHIPPED | OUTPATIENT
Start: 2017-09-18 | End: 2018-03-09 | Stop reason: SDUPTHER

## 2017-09-18 RX ORDER — LEVOTHYROXINE SODIUM 112 UG/1
112 TABLET ORAL DAILY
Qty: 90 TABLET | Refills: 1 | Status: SHIPPED | OUTPATIENT
Start: 2017-09-18 | End: 2018-03-09 | Stop reason: SDUPTHER

## 2017-09-18 NOTE — PATIENT INSTRUCTIONS
4 Steps for Eating Healthier  Changing the way you eat can improve your health. It can lower your cholesterol and blood pressure, and help you stay at a healthy weight. Your diet doesnt have to be bland and boring to be healthy. Just watch your calories and follow these steps:    1. Eat fewer unhealthy fats  · Choose more fish and lean meats instead of fatty cuts of meat.  · Skip butter and lard, and use less margarine.  · Pass on foods that have palm, coconut, or hydrogenated oils.  · Eat fewer high-fat dairy foods like cheese, ice cream, and whole milk.  · Get a heart-healthy cookbook and try some low-fat recipes.  2. Go light on salt  · Keep the saltshaker off the table.  · Limit high-salt ingredients, such as soy sauce, bouillon, and garlic salt.  · Instead of adding salt when cooking, season your food with herbs and flavorings. Try lemon, garlic, and onion.  · Limit convenience foods, such as boxed or canned foods and restaurant food.  · Read food labels and choose lower-sodium options.  3. Limit sugar  · Pause before you add sugars to pancakes, cereal, coffee, or tea. This includes white and brown table sugar, syrup, honey, and molasses. Cut your usual amount by half.  · Use non-sugar sweeteners. Stevia, aspartame, and sucralose can satisfy a sweet tooth without adding calories.  · Swap out sugar-filled soda and other drinks. Buy sugar-free or low-calorie beverages. Remember water is always the best choice.  · Read labels and choose foods with less added sugar. Keep in mind that dairy foods and foods with fruit will have some natural sugar.  · Cut the sugar in recipes by 1/3 to 1/2. Boost the flavor with extracts like almond, vanilla, or orange. Or add spices such as cinnamon or nutmeg.  4. Eat more fiber  · Eat fresh fruits and vegetables every day.  · Boost your diet with whole grains. Go for oats, whole-grain rice, and bran.  · Add beans and lentils to your meals.  · Drink more water to match your fiber  increase. This is to help prevent constipation.  Date Last Reviewed: 5/11/2015  © 5820-2557 [employer]. [address] [place] [address], [place] [address]. All rights reserved. This information is not intended as a substitute for professional medical care. Always follow your healthcare professional's instructions.

## 2017-09-18 NOTE — PROGRESS NOTES
HPI: Yudith Haider  is a 62 y.o. female who presents for annual physical .  No complaints at this time.     Review of Systems   Constitutional: Negative for appetite change, chills, diaphoresis, fatigue and fever.   HENT: Negative for congestion, ear pain, hearing loss, sore throat and trouble swallowing.    Eyes: Negative for photophobia, pain and visual disturbance.   Respiratory: Negative for cough, chest tightness and shortness of breath.    Cardiovascular: Negative for chest pain, palpitations and leg swelling.   Gastrointestinal: Negative for abdominal pain, blood in stool, constipation, diarrhea, nausea and vomiting.   Endocrine: Negative for cold intolerance and heat intolerance.   Genitourinary: Negative for difficulty urinating, flank pain, pelvic pain and vaginal pain.   Musculoskeletal: Negative for arthralgias and myalgias.   Skin: Negative for rash.   Allergic/Immunologic: Negative for immunocompromised state.   Neurological: Negative for dizziness, weakness, light-headedness and headaches.   Hematological: Negative for adenopathy. Does not bruise/bleed easily.   Psychiatric/Behavioral: Negative for confusion, self-injury and suicidal ideas.     Review of patient's allergies indicates:   Allergen Reactions    Lisinopril      Other reaction(s): Cough     Past Medical History:   Diagnosis Date    Hyperlipidemia     Hypertension     Hypothyroidism      Past Surgical History:   Procedure Laterality Date    HYSTERECTOMY  1981    THYROIDECTOMY  1993     Family History   Problem Relation Age of Onset    Hypertension Mother     Diabetes Mother     Stroke Father     Hypertension Father      Social History   Substance Use Topics    Smoking status: Former Smoker     Types: Cigarettes     Quit date: 1/1/2003    Smokeless tobacco: Never Used    Alcohol use Yes      Comment: occassional      Health Maintenance Topics with due status: Not Due       Topic Last Completion Date    TETANUS VACCINE  01/01/2010    Colonoscopy 11/01/2015    Lipid Panel 07/01/2016    Eye Exam 01/01/2017    Mammogram 04/28/2017       There is no immunization history on file for this patient.  OBJECTIVE:      Vitals:    09/18/17 1320   BP: 122/78   Pulse: 72   Resp: 18   Temp: 98.2 °F (36.8 °C)     Physical Exam   Constitutional: She is oriented to person, place, and time. She appears well-developed and well-nourished. She is cooperative. No distress.   HENT:   Head: Normocephalic and atraumatic.   Right Ear: Tympanic membrane normal.   Left Ear: Tympanic membrane normal.   Eyes: Conjunctivae, EOM and lids are normal. Pupils are equal, round, and reactive to light. Lids are everted and swept, no foreign bodies found. Right pupil is round and reactive. Left pupil is round and reactive.   Neck: Trachea normal and normal range of motion. Neck supple.   Cardiovascular: Normal rate, regular rhythm, S1 normal, S2 normal, normal heart sounds and intact distal pulses.  Exam reveals no gallop and no friction rub.    No murmur heard.  Pulmonary/Chest: Effort normal and breath sounds normal. No respiratory distress. She has no wheezes. She has no rales. She exhibits no tenderness.   Abdominal: Soft. Bowel sounds are normal. There is no rigidity and no guarding.   Musculoskeletal: Normal range of motion.   Lymphadenopathy:     She has no cervical adenopathy.     She has no axillary adenopathy.   Neurological: She is alert and oriented to person, place, and time.   Skin: Skin is warm and dry. Capillary refill takes less than 2 seconds.   Psychiatric: She has a normal mood and affect. Her behavior is normal. Judgment and thought content normal.   Nursing note and vitals reviewed.     Assessment:       1. Annual physical exam    2. Benign hypertension    3. Acquired hypothyroidism        Plan:       Annual physical exam  -     CBC auto differential; Future; Expected date: 09/18/2017  -     Comprehensive metabolic panel; Future; Expected date:  09/18/2017  -     Lipid panel; Future; Expected date: 09/18/2017  -     TSH; Future; Expected date: 09/18/2017  -     Urinalysis; Future; Expected date: 09/18/2017    Benign hypertension - stable.   Lifestyle changes: Reduce the amount of salt in your diet; Lose weight; Avoid drinking too much alcohol;Exercise at least 30 minutes per day most days of the week.  Continue current medications and home BP monitoring.   -     CBC auto differential; Future; Expected date: 09/18/2017  -     Comprehensive metabolic panel; Future; Expected date: 09/18/2017  -     Lipid panel; Future; Expected date: 09/18/2017  -     Urinalysis; Future; Expected date: 09/18/2017    Acquired hypothyroidism - Stable.  Continue current medications  -     Lipid panel; Future; Expected date: 09/18/2017  -     TSH; Future; Expected date: 09/18/2017        Patient counseled on age appropriate medical preventative services, age appropriate cancer screenings, nutrition, healthy diet, consistent exercise regimen and maintaining an active lifestyle.      Counseled on age appropriate vaccines and discussed upcoming health care needs based on age/gender.  Spent time with patient counseling on need for a good patient/doctor relationship moving forward.  Discussed use of common OTC medications and supplements.  Discussed common dietary aids and use of caffeine and the need for good sleep hygiene and stress management.    F/U in 1 year

## 2017-11-14 LAB
ALBUMIN SERPL-MCNC: 4.5 G/DL (ref 3.6–5.1)
ALBUMIN/GLOB SERPL: 1.7 (CALC) (ref 1–2.5)
ALP SERPL-CCNC: 84 U/L (ref 33–130)
ALT SERPL-CCNC: 11 U/L (ref 6–29)
AST SERPL-CCNC: 14 U/L (ref 10–35)
BASOPHILS # BLD AUTO: 28 CELLS/UL (ref 0–200)
BASOPHILS NFR BLD AUTO: 0.5 %
BILIRUB SERPL-MCNC: 0.4 MG/DL (ref 0.2–1.2)
BUN SERPL-MCNC: 15 MG/DL (ref 7–25)
BUN/CREAT SERPL: NORMAL (CALC) (ref 6–22)
CALCIUM SERPL-MCNC: 9.3 MG/DL (ref 8.6–10.4)
CHLORIDE SERPL-SCNC: 104 MMOL/L (ref 98–110)
CHOLEST SERPL-MCNC: 213 MG/DL
CHOLEST/HDLC SERPL: 2.9 (CALC)
CO2 SERPL-SCNC: 28 MMOL/L (ref 20–31)
CREAT SERPL-MCNC: 0.67 MG/DL (ref 0.5–0.99)
EOSINOPHIL # BLD AUTO: 62 CELLS/UL (ref 15–500)
EOSINOPHIL NFR BLD AUTO: 1.1 %
ERYTHROCYTE [DISTWIDTH] IN BLOOD BY AUTOMATED COUNT: 11.8 % (ref 11–15)
GFR SERPL CREATININE-BSD FRML MDRD: 94 ML/MIN/1.73M2
GLOBULIN SER CALC-MCNC: 2.6 G/DL (CALC) (ref 1.9–3.7)
GLUCOSE SERPL-MCNC: 93 MG/DL (ref 65–99)
HCT VFR BLD AUTO: 38 % (ref 35–45)
HDLC SERPL-MCNC: 74 MG/DL
HGB BLD-MCNC: 12.7 G/DL (ref 11.7–15.5)
LDLC SERPL CALC-MCNC: 118 MG/DL (CALC)
LYMPHOCYTES # BLD AUTO: 1590 CELLS/UL (ref 850–3900)
LYMPHOCYTES NFR BLD AUTO: 28.4 %
MCH RBC QN AUTO: 32.2 PG (ref 27–33)
MCHC RBC AUTO-ENTMCNC: 33.4 G/DL (ref 32–36)
MCV RBC AUTO: 96.4 FL (ref 80–100)
MONOCYTES # BLD AUTO: 409 CELLS/UL (ref 200–950)
MONOCYTES NFR BLD AUTO: 7.3 %
NEUTROPHILS # BLD AUTO: 3511 CELLS/UL (ref 1500–7800)
NEUTROPHILS NFR BLD AUTO: 62.7 %
NONHDLC SERPL-MCNC: 139 MG/DL (CALC)
PLATELET # BLD AUTO: 254 THOUSAND/UL (ref 140–400)
PMV BLD REES-ECKER: 10 FL (ref 7.5–12.5)
POTASSIUM SERPL-SCNC: 4.5 MMOL/L (ref 3.5–5.3)
PROT SERPL-MCNC: 7.1 G/DL (ref 6.1–8.1)
RBC # BLD AUTO: 3.94 MILLION/UL (ref 3.8–5.1)
SODIUM SERPL-SCNC: 141 MMOL/L (ref 135–146)
TRIGL SERPL-MCNC: 100 MG/DL
TSH SERPL-ACNC: 1.52 MIU/L (ref 0.4–4.5)
WBC # BLD AUTO: 5.6 THOUSAND/UL (ref 3.8–10.8)

## 2017-12-23 ENCOUNTER — HOSPITAL ENCOUNTER (EMERGENCY)
Facility: HOSPITAL | Age: 62
Discharge: HOME | End: 2017-12-23
Attending: EMERGENCY MEDICINE
Payer: COMMERCIAL

## 2017-12-23 VITALS
OXYGEN SATURATION: 99 % | DIASTOLIC BLOOD PRESSURE: 89 MMHG | HEIGHT: 62 IN | HEART RATE: 76 BPM | BODY MASS INDEX: 28.16 KG/M2 | SYSTOLIC BLOOD PRESSURE: 182 MMHG | RESPIRATION RATE: 12 BRPM | TEMPERATURE: 99 F | WEIGHT: 153 LBS

## 2017-12-23 DIAGNOSIS — M25.512 LEFT SHOULDER PAIN: ICD-10-CM

## 2017-12-23 DIAGNOSIS — S43.402A SPRAIN OF LEFT SHOULDER, UNSPECIFIED SHOULDER SPRAIN TYPE, INITIAL ENCOUNTER: Primary | ICD-10-CM

## 2017-12-23 PROCEDURE — 99283 EMERGENCY DEPT VISIT LOW MDM: CPT | Mod: 25

## 2017-12-23 PROCEDURE — 96372 THER/PROPH/DIAG INJ SC/IM: CPT

## 2017-12-23 PROCEDURE — 63600175 PHARM REV CODE 636 W HCPCS: Performed by: PHYSICIAN ASSISTANT

## 2017-12-23 RX ORDER — METHOCARBAMOL 500 MG/1
500 TABLET, FILM COATED ORAL 3 TIMES DAILY
Qty: 15 TABLET | Refills: 0 | Status: SHIPPED | OUTPATIENT
Start: 2017-12-23 | End: 2017-12-23 | Stop reason: ALTCHOICE

## 2017-12-23 RX ORDER — METAXALONE 800 MG/1
800 TABLET ORAL 3 TIMES DAILY
Qty: 15 TABLET | Refills: 0 | Status: SHIPPED | OUTPATIENT
Start: 2017-12-23 | End: 2017-12-28

## 2017-12-23 RX ORDER — KETOROLAC TROMETHAMINE 30 MG/ML
30 INJECTION, SOLUTION INTRAMUSCULAR; INTRAVENOUS
Status: COMPLETED | OUTPATIENT
Start: 2017-12-23 | End: 2017-12-23

## 2017-12-23 RX ORDER — NAPROXEN 500 MG/1
500 TABLET ORAL 2 TIMES DAILY WITH MEALS
Qty: 60 TABLET | Refills: 0 | Status: SHIPPED | OUTPATIENT
Start: 2017-12-23 | End: 2018-02-06

## 2017-12-23 RX ADMIN — KETOROLAC TROMETHAMINE 30 MG: 30 INJECTION, SOLUTION INTRAMUSCULAR at 11:12

## 2017-12-23 NOTE — ED NOTES
LEFT upper arm pain. Denies trauma. Reports lifting 50# bags of chicken feed. ROM intact. Painful to lift left arm. No redness, swelling, or deformity noted.

## 2017-12-23 NOTE — ED PROVIDER NOTES
"Encounter Date: 12/23/2017    SCRIBE #1 NOTE: I, Mary Washington, am scribing for, and in the presence of, Concepcion Pinto PA-C.       History     Chief Complaint   Patient presents with    Arm Pain     L upper arm pain x 1 week, worsening, s/p lifting 50lb bags of feed       12/23/2017 10:43 AM     Chief complaint: Arm Pain      Yudith Haider is a 62 y.o. female with PMHx of HTN and HLD who presents to the ED with complaints of worsening left shoulder pain that started a week ago s/p lifting 50lb bags of feed. Patient describes pain as severe and is unable to sleep comfortably or raise arm without pain. She has taken tylenol arthritis and ibuprofen for pain, with no improvements. She states pain is "beginning to affect her fingers". She denies left wrist and left elbow pain. She has no other medical concerns or complaints at the moment and denies new onset of other symptoms. No pertinent SHx noted. Lisinopril allergy noted.       The history is provided by the patient.     Review of patient's allergies indicates:   Allergen Reactions    Lisinopril      Other reaction(s): Cough     Past Medical History:   Diagnosis Date    Hyperlipidemia     Hypertension     Hypothyroidism      Past Surgical History:   Procedure Laterality Date    HYSTERECTOMY  1981    THYROIDECTOMY  1993     Family History   Problem Relation Age of Onset    Hypertension Mother     Diabetes Mother     Stroke Father     Hypertension Father      Social History   Substance Use Topics    Smoking status: Former Smoker     Types: Cigarettes     Quit date: 1/1/2003    Smokeless tobacco: Never Used    Alcohol use Yes      Comment: occassional     Review of Systems   Constitutional: Negative for activity change, appetite change, chills and fever.   HENT: Negative for congestion, rhinorrhea and sore throat.    Respiratory: Negative for cough, chest tightness and shortness of breath.    Cardiovascular: Negative for chest pain. "   Gastrointestinal: Negative for abdominal distention, abdominal pain, diarrhea, nausea and vomiting.   Genitourinary: Negative for dysuria and frequency.   Musculoskeletal: Positive for arthralgias (left shoulder). Negative for back pain, joint swelling, neck pain and neck stiffness.   Skin: Negative for rash and wound.   Neurological: Negative for dizziness, syncope, numbness and headaches.   Hematological: Does not bruise/bleed easily.       Physical Exam     Initial Vitals [12/23/17 1001]   BP Pulse Resp Temp SpO2   (!) 182/89 76 12 98.7 °F (37.1 °C) 99 %      MAP       120         Physical Exam    Constitutional: Vital signs are normal. She appears well-developed and well-nourished. She is cooperative.  Non-toxic appearance. She does not have a sickly appearance.   HENT:   Head: Normocephalic and atraumatic.   Right Ear: External ear normal.   Left Ear: External ear normal.   Nose: Nose normal.   Mouth/Throat: Oropharynx is clear and moist.   Eyes: Conjunctivae and lids are normal. Pupils are equal, round, and reactive to light.   Neck: Normal range of motion and full passive range of motion without pain. Neck supple.   Cardiovascular: Normal rate and regular rhythm.   Pulmonary/Chest: Breath sounds normal. She has no wheezes. She has no rales.   Abdominal: Soft. Normal appearance. There is no tenderness. There is no rigidity, no rebound and no guarding.   Musculoskeletal: She exhibits no edema.        Right shoulder: She exhibits tenderness and bony tenderness. She exhibits no swelling, normal pulse and normal strength.        Left shoulder: She exhibits decreased range of motion (secondary to pain) and bony tenderness. She exhibits no swelling and no deformity.   No skin changes, swelling, erythema, or warmth to left shoulder.   Neurological: She is alert and oriented to person, place, and time.   Skin: Skin is warm, dry and intact. No rash noted.         ED Course   Procedures  Labs Reviewed - No data to  display          Medical Decision Making:   History:   Old Medical Records: I decided to obtain old medical records.  Clinical Tests:   Radiological Study: Ordered and Reviewed       APC / Resident Notes:   This is an urgent evaluation of a 62 year old female with complaint of shoulder pain. She reports she lifted a large bag of chicken feed prior to the onset of pain. She denied chest pain. Patient is noted to have tenderness to palpation with bony tenderness to shoulder. There is pain with full ROM. Equal strength to bilateral upper extremities. Pulses noted. Sensation intact. X-ray is negative. I have given the patient instructions on R.I.C.E therapy. Patient to follow up with orthopedics if symptoms don't improve. Symptomatic treatment given. Discussed results with patient. Return precautions given. Patient is to follow up with their primary care provider. Case was discussed with Dr. Daniel who is in agreement with the plan of care. All questions answered.          Scribe Attestation:   Scribe #1: I performed the above scribed service and the documentation accurately describes the services I performed. I attest to the accuracy of the note.      I, Concepcion Pinto PA-C, personally performed the services described in this documentation. All medical record entries made by the scribe were at my direction and in my presence.  I have reviewed the chart and agree that the record reflects my personal performance and is accurate and complete. Concepcion Pinto PA-C.  9:27 PM 12/23/2017          ED Course      Clinical Impression:   The primary encounter diagnosis was Sprain of left shoulder, unspecified shoulder sprain type, initial encounter. A diagnosis of Left shoulder pain was also pertinent to this visit.    Disposition:   Disposition: Discharged  Condition: Stable                        Concepcion Pinto PA-C  12/23/17 4560

## 2017-12-23 NOTE — DISCHARGE INSTRUCTIONS
Follow up with orthopedics if symptoms don't improve.  Take medications as prescribed.  For worsening symptoms, chest pain, shortness of breath, increased abdominal pain, high grade fever, stroke or stroke like symptoms, immediately go to the nearest Emergency Room or call 911 as soon as possible.

## 2018-01-15 ENCOUNTER — OFFICE VISIT (OUTPATIENT)
Dept: ORTHOPEDICS | Facility: CLINIC | Age: 63
End: 2018-01-15
Payer: COMMERCIAL

## 2018-01-15 VITALS
HEIGHT: 62 IN | HEART RATE: 69 BPM | BODY MASS INDEX: 28.16 KG/M2 | WEIGHT: 153 LBS | DIASTOLIC BLOOD PRESSURE: 67 MMHG | SYSTOLIC BLOOD PRESSURE: 138 MMHG

## 2018-01-15 DIAGNOSIS — M75.102 ROTATOR CUFF SYNDROME, LEFT: Primary | ICD-10-CM

## 2018-01-15 PROCEDURE — 99203 OFFICE O/P NEW LOW 30 MIN: CPT | Mod: 25,S$GLB,, | Performed by: ORTHOPAEDIC SURGERY

## 2018-01-15 PROCEDURE — 20610 DRAIN/INJ JOINT/BURSA W/O US: CPT | Mod: LT,S$GLB,, | Performed by: ORTHOPAEDIC SURGERY

## 2018-01-15 PROCEDURE — 99999 PR PBB SHADOW E&M-EST. PATIENT-LVL III: CPT | Mod: PBBFAC,,, | Performed by: ORTHOPAEDIC SURGERY

## 2018-01-15 RX ORDER — TRIAMCINOLONE ACETONIDE 40 MG/ML
40 INJECTION, SUSPENSION INTRA-ARTICULAR; INTRAMUSCULAR
Status: DISCONTINUED | OUTPATIENT
Start: 2018-01-15 | End: 2018-01-15 | Stop reason: HOSPADM

## 2018-01-15 RX ADMIN — TRIAMCINOLONE ACETONIDE 40 MG: 40 INJECTION, SUSPENSION INTRA-ARTICULAR; INTRAMUSCULAR at 01:01

## 2018-01-15 NOTE — PROCEDURES
Large Joint Aspiration/Injection  Date/Time: 1/15/2018 1:50 PM  Performed by: LIANE MCKEON  Authorized by: LIANE MCKEON     Consent Done?:  Yes (Verbal)  Indications:  Pain  Procedure site marked: Yes    Timeout: Prior to procedure the correct patient, procedure, and site was verified      Location:  Shoulder  Site:  L subacromial bursa  Prep: Patient was prepped and draped in usual sterile fashion    Ultrasonic Guidance for needle placement: No  Needle size:  20 G  Approach:  Posterior  Medications:  40 mg triamcinolone acetonide 40 mg/mL  Patient tolerance:  Patient tolerated the procedure well with no immediate complications

## 2018-01-15 NOTE — PROGRESS NOTES
Past Medical History:   Diagnosis Date    Hyperlipidemia     Hypertension     Hypothyroidism        Past Surgical History:   Procedure Laterality Date    HYSTERECTOMY  1981    THYROIDECTOMY  1993       Current Outpatient Prescriptions   Medication Sig    amlodipine (NORVASC) 2.5 MG tablet Take 1 tablet (2.5 mg total) by mouth Daily.    bisoprolol (ZEBETA) 10 MG tablet Take 1 tablet (10 mg total) by mouth once daily.    COCONUT OIL ORAL Take 1 tablet by mouth Daily.    dicyclomine (BENTYL) 20 mg tablet Take 1 tablet by mouth Daily.    levothyroxine (SYNTHROID) 112 MCG tablet Take 1 tablet (112 mcg total) by mouth once daily.    naproxen (NAPROSYN) 500 MG tablet Take 1 tablet (500 mg total) by mouth 2 (two) times daily with meals.    UBIDECARENONE (COENZYME Q10) 60 mg Cap Take 1 capsule by mouth Daily.     No current facility-administered medications for this visit.        Review of patient's allergies indicates:   Allergen Reactions    Lisinopril      Other reaction(s): Cough       Family History   Problem Relation Age of Onset    Hypertension Mother     Diabetes Mother     Stroke Father     Hypertension Father        Social History     Social History    Marital status:      Spouse name: N/A    Number of children: N/A    Years of education: N/A     Occupational History    Not on file.     Social History Main Topics    Smoking status: Former Smoker     Types: Cigarettes     Quit date: 1/1/2003    Smokeless tobacco: Never Used    Alcohol use Yes      Comment: occassional    Drug use: No    Sexual activity: Not on file     Other Topics Concern    Not on file     Social History Narrative    No narrative on file       Chief Complaint:   Chief Complaint   Patient presents with    Left Shoulder - Pain       History of present illness: This is a 62-year-old right-hand-dominant female with a chronic history of neck and arm pain.  Patient states that started back in 2016 in October while  riding a lawnmower.  Patient has had MRIs of her neck.  She's had epidurals.  She's had physical therapy for her neck a couple times.  No treatment directed specifically for the shoulder.  Symptoms are worsening and are moderate.  Pain reaching up above her head.  Pain as a 5 out of 10.      Review of Systems:    Constitution: Negative for chills, fever, and sweats.  Negative for unexplained weight loss.    HENT:  Negative for headaches and blurry vision.    Cardiovascular:Negative for chest pain or irregular heart beat. Negative for hypertension.    Respiratory:  Negative for cough and shortness of breath.    Gastrointestinal: Negative for abdominal pain, heartburn, melena, nausea, and vomitting.    Genitourinary:  Negative bladder incontinence and dysuria.    Musculoskeletal:  See HPI    Neurological: Negative for numbness.    Psychiatric/Behavioral: Negative for depression.  The patient is not nervous/anxious.      Endocrine: Negative for polyuria    Hematologic/Lymphatic: Negative for bleeding problem.  Does not bruise/bleed easily.    Skin: Negative for poor would healing and rash      Physical Examination:    Vital Signs:    Vitals:    01/15/18 1326   BP: 138/67   Pulse: 69       Body mass index is 27.98 kg/m².    This a well-developed, well nourished patient in no acute distress.  They are alert and oriented and cooperative to examination.  Pt. walks without an antalgic gait.      Examination of the left shoulder shows no rashes or erythema. There are no masses, ecchymosis, or atrophy. The patient has full range of motion in forward flexion, external rotation, and internal rotation to the mid T-spine. The patient has moderately positive impingement signs. - [place]'s test. - Speeds test. Nontender to palpation over a.c. joint. Normal stability anteriorly, posteriorly, and negative sulcus sign. Passive range of motion: Forward flexion of 180°, external rotation at 90° of 90°, internal rotation of 50°, and  external rotation at 0° of 50°. 2+ radial pulse. Intact axillary, radial, median and ulnar sensation. 5 out of 5 resisted forward flexion, external rotation, and negative lift off test.    Examination of the right shoulder shows no rashes or erythema. There are no masses, ecchymosis, or atrophy. The patient has full range of motion in forward flexion, external rotation, and internal rotation to the mid T-spine. The patient has - impingement signs. - [place]'s test. - Speeds test. Nontender to palpation over a.c. joint. Normal stability anteriorly, posteriorly, and negative sulcus sign. Passive range of motion: Forward flexion of 180°, external rotation at 90° of 90°, internal rotation of 50°, and external rotation at 0° of 50°. 2+ radial pulse. Intact axillary, radial, median and ulnar sensation. 5 out of 5 resisted forward flexion, external rotation, and negative lift off test.    X-rays: X-rays left shoulder are available for review which show no acute fractures or significant arthritis     Assessment:: Left rotator cuff syndrome    Plan:  I reviewed the findings with her today.  I recommended a cortisone injection.  Talked about possibly getting an MRI if the pain does not improve.  Follow-up as needed.    This note was created using Dragon voice recognition software that occasionally misinterpreted phrases or words.    Consult note is delivered via Epic messaging service.

## 2018-01-29 ENCOUNTER — HOSPITAL ENCOUNTER (EMERGENCY)
Facility: HOSPITAL | Age: 63
Discharge: HOME | End: 2018-01-29
Attending: EMERGENCY MEDICINE
Payer: COMMERCIAL

## 2018-01-29 VITALS
WEIGHT: 148 LBS | OXYGEN SATURATION: 100 % | HEART RATE: 93 BPM | BODY MASS INDEX: 26.22 KG/M2 | TEMPERATURE: 99 F | HEIGHT: 63 IN | SYSTOLIC BLOOD PRESSURE: 116 MMHG | DIASTOLIC BLOOD PRESSURE: 80 MMHG | RESPIRATION RATE: 26 BRPM

## 2018-01-29 DIAGNOSIS — R06.02 SOB (SHORTNESS OF BREATH): ICD-10-CM

## 2018-01-29 DIAGNOSIS — J10.1 INFLUENZA B: Primary | ICD-10-CM

## 2018-01-29 LAB
FLUAV AG SPEC QL IA: NEGATIVE
FLUBV AG SPEC QL IA: POSITIVE
SPECIMEN SOURCE: ABNORMAL

## 2018-01-29 PROCEDURE — 96372 THER/PROPH/DIAG INJ SC/IM: CPT

## 2018-01-29 PROCEDURE — 25000003 PHARM REV CODE 250: Performed by: EMERGENCY MEDICINE

## 2018-01-29 PROCEDURE — 99284 EMERGENCY DEPT VISIT MOD MDM: CPT | Mod: 25

## 2018-01-29 PROCEDURE — 87400 INFLUENZA A/B EACH AG IA: CPT

## 2018-01-29 PROCEDURE — 63600175 PHARM REV CODE 636 W HCPCS: Performed by: EMERGENCY MEDICINE

## 2018-01-29 RX ORDER — ONDANSETRON 4 MG/1
4 TABLET, ORALLY DISINTEGRATING ORAL
Status: COMPLETED | OUTPATIENT
Start: 2018-01-29 | End: 2018-01-29

## 2018-01-29 RX ORDER — ONDANSETRON 4 MG/1
4 TABLET, ORALLY DISINTEGRATING ORAL EVERY 8 HOURS PRN
Qty: 12 TABLET | Refills: 0 | Status: SHIPPED | OUTPATIENT
Start: 2018-01-29 | End: 2018-10-04

## 2018-01-29 RX ORDER — BENZONATATE 100 MG/1
100 CAPSULE ORAL 3 TIMES DAILY PRN
Qty: 20 CAPSULE | Refills: 0 | Status: SHIPPED | OUTPATIENT
Start: 2018-01-29 | End: 2018-02-06

## 2018-01-29 RX ORDER — BENZONATATE 100 MG/1
100 CAPSULE ORAL
Status: COMPLETED | OUTPATIENT
Start: 2018-01-29 | End: 2018-01-29

## 2018-01-29 RX ORDER — KETOROLAC TROMETHAMINE 30 MG/ML
10 INJECTION, SOLUTION INTRAMUSCULAR; INTRAVENOUS
Status: COMPLETED | OUTPATIENT
Start: 2018-01-29 | End: 2018-01-29

## 2018-01-29 RX ORDER — DICLOFENAC SODIUM 50 MG/1
50 TABLET, DELAYED RELEASE ORAL 3 TIMES DAILY PRN
Qty: 30 TABLET | Refills: 0 | Status: SHIPPED | OUTPATIENT
Start: 2018-01-29 | End: 2018-02-06

## 2018-01-29 RX ADMIN — BENZONATATE 100 MG: 100 CAPSULE ORAL at 07:01

## 2018-01-29 RX ADMIN — ONDANSETRON 4 MG: 4 TABLET, ORALLY DISINTEGRATING ORAL at 07:01

## 2018-01-29 RX ADMIN — KETOROLAC TROMETHAMINE 10 MG: 30 INJECTION, SOLUTION INTRAMUSCULAR at 07:01

## 2018-01-29 NOTE — ED PROVIDER NOTES
Encounter Date: 1/29/2018       History     Chief Complaint   Patient presents with    Cough     With associated N/V, fever, sore throat, and SOB x 3 days     Shortness of Breath    Vomiting    Fever     62-year-old female with a past medical history of hypertension, hyperlipidemia, and hypothyroidism presents with a chief complaint of shortness of breath and a cough.  The patient reports her symptoms started acutely 4 days ago and have been getting progressively worse.  She reports they are associated with pleuritic chest pain, nausea/vomiting, diarrhea, body aches, and fever.  The patient denies any associated abdominal pain.  There are no alleviating factors.  The patient's symptoms are unrelieved with over-the-counter medications.  There are no aggravating factors.          Review of patient's allergies indicates:   Allergen Reactions    Lisinopril      Other reaction(s): Cough     Past Medical History:   Diagnosis Date    Hyperlipidemia     Hypertension     Hypothyroidism      Past Surgical History:   Procedure Laterality Date    HYSTERECTOMY  1981    THYROIDECTOMY  1993     Family History   Problem Relation Age of Onset    Hypertension Mother     Diabetes Mother     Stroke Father     Hypertension Father      Social History   Substance Use Topics    Smoking status: Former Smoker     Types: Cigarettes     Quit date: 1/1/2003    Smokeless tobacco: Never Used    Alcohol use Yes      Comment: occassional     Review of Systems   Constitutional: Positive for chills and fever.   HENT: Positive for congestion.    Respiratory: Positive for cough and shortness of breath.    Cardiovascular: Positive for chest pain.   Gastrointestinal: Positive for diarrhea, nausea and vomiting. Negative for abdominal pain.   Genitourinary: Negative for dysuria.   Musculoskeletal: Negative for gait problem.   Skin: Negative for color change.   Neurological: Negative for dizziness and numbness.   Psychiatric/Behavioral:  Negative for agitation.       Physical Exam     Initial Vitals [01/29/18 0630]   BP Pulse Resp Temp SpO2   121/77 (!) 121 (!) 26 98.8 °F (37.1 °C) 99 %      MAP       91.67         Physical Exam    Nursing note and vitals reviewed.  Constitutional: She appears well-developed and well-nourished.   HENT:   Head: Atraumatic.   Eyes: EOM are normal. Pupils are equal, round, and reactive to light.   Neck: Normal range of motion.   Cardiovascular: Normal rate and regular rhythm.   Pulmonary/Chest: Breath sounds normal.   Abdominal: Soft. Bowel sounds are normal.   Musculoskeletal: Normal range of motion.        Right shoulder: Normal.        Left shoulder: Normal.   Neurological: She is alert and oriented to person, place, and time.   Skin: Skin is warm and dry.   Psychiatric: She has a normal mood and affect.         ED Course   Procedures  Labs Reviewed   INFLUENZA A AND B ANTIGEN             Medical Decision Making:   Initial Assessment:   62-year-old female presented with a chief complaint of shortness of breath and cough.  Differential Diagnosis:   Initial differential diagnosis included but not limited to pneumonia, influenza, and bronchitis.  Clinical Tests:   Lab Tests: Ordered and Reviewed  Radiological Study: Ordered and Reviewed  ED Management:  The patient was emergently evaluated in the emergency Department, her evaluation was significant for an elderly female with a normal lung exam.  The patient's x-ray showed no acute abnormalities per my independent interpretation.  The patient's influenza swab was positive for influenza B.  The patient was treated with medications for symptomatic relief in the emergency department.  The patient is outside of the Tamiflu window.  The patient will be discharged home with medications for symptomatic relief and she is to follow-up with her PCP for further care.                   ED Course      Clinical Impression:   The primary encounter diagnosis was Influenza B. A  diagnosis of SOB (shortness of breath) was also pertinent to this visit.                           Vamshi Dueñas MD  01/29/18 1038

## 2018-02-06 ENCOUNTER — OFFICE VISIT (OUTPATIENT)
Dept: FAMILY MEDICINE | Facility: CLINIC | Age: 63
End: 2018-02-06
Payer: COMMERCIAL

## 2018-02-06 VITALS
HEIGHT: 63 IN | OXYGEN SATURATION: 99 % | WEIGHT: 148 LBS | HEART RATE: 94 BPM | DIASTOLIC BLOOD PRESSURE: 70 MMHG | BODY MASS INDEX: 26.22 KG/M2 | SYSTOLIC BLOOD PRESSURE: 120 MMHG | TEMPERATURE: 98 F

## 2018-02-06 DIAGNOSIS — R06.2 WHEEZING: Primary | ICD-10-CM

## 2018-02-06 DIAGNOSIS — R05.9 COUGH: ICD-10-CM

## 2018-02-06 DIAGNOSIS — J18.9 COMMUNITY ACQUIRED PNEUMONIA, UNSPECIFIED LATERALITY: ICD-10-CM

## 2018-02-06 PROCEDURE — 3008F BODY MASS INDEX DOCD: CPT | Mod: ,,, | Performed by: NURSE PRACTITIONER

## 2018-02-06 PROCEDURE — 99213 OFFICE O/P EST LOW 20 MIN: CPT | Mod: ,,, | Performed by: NURSE PRACTITIONER

## 2018-02-06 RX ORDER — PROMETHAZINE HYDROCHLORIDE AND CODEINE PHOSPHATE 6.25; 1 MG/5ML; MG/5ML
5 SOLUTION ORAL EVERY 4 HOURS PRN
Qty: 118 ML | Refills: 0 | Status: SHIPPED | OUTPATIENT
Start: 2018-02-06 | End: 2018-02-16

## 2018-02-06 RX ORDER — ALBUTEROL SULFATE 90 UG/1
2 AEROSOL, METERED RESPIRATORY (INHALATION) EVERY 4 HOURS PRN
Qty: 1 INHALER | Refills: 0 | Status: SHIPPED | OUTPATIENT
Start: 2018-02-06 | End: 2018-10-04

## 2018-02-06 RX ORDER — AZITHROMYCIN 250 MG/1
250 TABLET, FILM COATED ORAL DAILY
Qty: 6 TABLET | Refills: 0 | Status: SHIPPED | OUTPATIENT
Start: 2018-02-06 | End: 2018-03-09 | Stop reason: ALTCHOICE

## 2018-02-06 NOTE — PATIENT INSTRUCTIONS
Pneumonia (Adult)  Pneumonia is an infection deep within the lungs. It is in the small air sacs (alveoli). Pneumonia may be caused by a virus or bacteria. Pneumonia caused by bacteria is usually treated with an antibiotic. Severe cases may need to be treated in the hospital. Milder cases can be treated at home. Symptoms usually start to get better during the first 2 days of treatment.    Home care  Follow these guidelines when caring for yourself at home:  · Rest at home for the first 2 to 3 days, or until you feel stronger. Dont let yourself get overly tired when you go back to your activities.  · Stay away from cigarette smoke - yours or other peoples.  · You may use acetaminophen or ibuprofen to control fever or pain, unless another medicine was prescribed. If you have chronic liver or kidney disease, talk with your healthcare provider before using these medicines. Also talk with your provider if youve had a stomach ulcer or gastrointestinal bleeding. Dont give aspirin to anyone younger than 18 years of age who is ill with a fever. It may cause severe liver damage.  · Your appetite may be poor, so a light diet is fine.  · Drink 6 to 8 glasses of fluids every day to make sure you are getting enough fluids. Beverages can include water, sport drinks, sodas without caffeine, juices, tea, or soup. Fluids will help loosen secretions in the lung. This will make it easier for you to cough up the phlegm (sputum). If you also have heart or kidney disease, check with your healthcare provider before you drink extra fluids.  · Take antibiotic medicine prescribed until it is all gone, even if you are feeling better after a few days.  Follow-up care  Follow up with your healthcare provider in the next 2 to 3 days, or as advised. This is to be sure the medicine is helping you get better.  If you are 65 or older, you should get a pneumococcal vaccine and a yearly flu (influenza) shot. You should also get these vaccines if  you have chronic lung disease like asthma, emphysema, or COPD. Recently, a second type of pneumonia vaccine has become available for everyone over 65 years old. This is in addition to the previous vaccine. Ask your provider about this.  When to seek medical advice  Call your healthcare provider right away if any of these occur:  · You dont get better within the first 48 hours of treatment  · Shortness of breath gets worse  · Rapid breathing (more than 25 breaths per minute)  · Coughing up blood  · Chest pain gets worse with breathing  · Fever of 100.4°F (38°C) or higher that doesnt get better with fever medicine  · Weakness, dizziness, or fainting that gets worse  · Thirst or dry mouth that gets worse  · Sinus pain, headache, or a stiff neck  · Chest pain not caused by coughing  Date Last Reviewed: 1/1/2017  © 6590-7063 [employer]. [address] [place] [address]. All rights reserved. This information is not intended as a substitute for professional medical care. Always follow your healthcare professional's instructions.        Bronchitis with Wheezing (Viral or Bacterial: Adult)    Bronchitis is an infection of the air passages. It often occurs during a cold and is usually caused by a virus. Symptoms include cough with mucus (phlegm) and low-grade fever. This illness is contagious during the first few days and is spread through the air by coughing and sneezing, or by direct contact (touching the sick person and then touching your own eyes, nose, or mouth).  If there is a lot of inflammation, air flow is restricted. The air passages may also go into spasm, especially if you have asthma. This causes wheezing and difficulty breathing even in people who do not have asthma.  Bronchitis usually lasts 7 to 14 days. The wheezing should improve with treatment during the first week. An inhaler is often prescribed to relax the air passages and stop wheezing. Antibiotics will be prescribed if your  doctor thinks there is also a secondary bacterial infection.  Home care  · If symptoms are severe, rest at home for the first 2 to 3 days. When you go back to your usual activities, don't let yourself get too tired.  · Do not smoke. Also avoid being exposed to secondhand smoke.  · You may use over-the-counter medicine to control fever or pain, unless another medicine was prescribed. Note: If you have chronic liver or kidney disease or have ever had a stomach ulcer or gastrointestinal bleeding, talk with your healthcare provider before using these medicines. Also talk to your provider if you are taking medicine to prevent blood clots.) Aspirin should never be given to anyone younger than 18 years of age who is ill with a viral infection or fever. It may cause severe liver or brain damage.  · Your appetite may be poor, so a light diet is fine. Avoid dehydration by drinking 6 to 8 glasses of fluids per day (such as water, soft drinks, sports drinks, juices, tea, or soup). Extra fluids will help loosen secretions in the nose and lungs.  · Over-the-counter cough, cold, and sore-throat medicines will not shorten the length of the illness, but they may be helpful to reduce symptoms. (Note: Do not use decongestants if you have high blood pressure.)  · If you were given an inhaler, use it exactly as directed. If you need to use it more often than prescribed, your condition may be worsening. If this happens, contact your healthcare provider.  · If prescribed, finish all antibiotic medicine, even if you are feeling better after only a few days.  Follow-up care  Follow up with your healthcare provider, or as advised. If you had an X-ray or ECG (electrocardiogram), a specialist will review it. You will be notified of any new findings that may affect your care.  Note: If you are age 65 or older, or if you have a chronic lung disease or condition that affects your immune system, or you smoke, talk to your healthcare provider about  having a pneumococcal vaccinations and a yearly influenza vaccination (flu shot).  When to seek medical advice  Call your healthcare provider right away if any of these occur:  · Fever of 100.4°F (38°C) or higher  · Coughing up increasing amounts of colored sputum  · Weakness, drowsiness, headache, facial pain, ear pain, or a stiff neck  Call 911, or get immediate medical care  Contact emergency services right away if any of these occur.  · Coughing up blood  · Worsening weakness, drowsiness, headache, or stiff neck  · Increased wheezing not helped with medication, shortness of breath, or pain with breathing  Date Last Reviewed: 9/13/2015  © 6828-0681 [employer]. [address] [place] [address], [place] [address]. All rights reserved. This information is not intended as a substitute for professional medical care. Always follow your healthcare professional's instructions.

## 2018-02-06 NOTE — PROGRESS NOTES
Subjective:       Patient ID: Yudith Haider is a 62 y.o. female.    Chief Complaint: Follow-up ([facility] ER visit) and Cough (severe)    Patient was seen in the ER on 1/29/2018 and diagnosed with influenza b.  Patient has a persistent cough that has not cleared.  Patient has been afebrile and felt better from the flu symptoms.  Cough is the only thing that persists.        Cough   This is a new problem. The current episode started 1 to 4 weeks ago (2 weeks). The problem has been waxing and waning. The problem occurs constantly. The cough is non-productive (small amount ). Associated symptoms include shortness of breath and wheezing. Pertinent negatives include no chest pain, chills, ear congestion, ear pain, fever, headaches, heartburn, hemoptysis, myalgias, nasal congestion, postnasal drip, rash, rhinorrhea, sore throat, sweats or weight loss. The symptoms are aggravated by lying down. She has tried prescription cough suppressant, rest and cool air for the symptoms. The treatment provided mild relief. There is no history of asthma, COPD or pneumonia.     Review of Systems   Constitutional: Positive for fatigue. Negative for activity change, appetite change, chills, fever and weight loss.   HENT: Negative for congestion, ear discharge, ear pain, postnasal drip, rhinorrhea, sinus pain, sinus pressure, sneezing, sore throat, trouble swallowing and voice change.    Eyes: Negative for photophobia, pain, discharge and visual disturbance.   Respiratory: Positive for cough, choking, shortness of breath and wheezing. Negative for apnea, hemoptysis, chest tightness and stridor.    Cardiovascular: Negative for chest pain and palpitations.   Gastrointestinal: Negative for abdominal pain, heartburn, nausea and vomiting.   Endocrine: Negative for cold intolerance and heat intolerance.   Genitourinary: Negative for difficulty urinating and dysuria.   Musculoskeletal: Negative for arthralgias, gait problem and myalgias.   Skin:  Negative for rash.   Allergic/Immunologic: Negative for immunocompromised state.   Neurological: Negative for speech difficulty and headaches.   Psychiatric/Behavioral: Negative for confusion, self-injury and suicidal ideas.       Past Medical History:   Diagnosis Date    Hyperlipidemia     Hypertension     Hypothyroidism       Past Surgical History:   Procedure Laterality Date    HYSTERECTOMY  1981    THYROIDECTOMY  1993       Family History   Problem Relation Age of Onset    Hypertension Mother     Diabetes Mother     Stroke Father     Hypertension Father        Social History     Social History    Marital status:      Spouse name: N/A    Number of children: N/A    Years of education: N/A     Social History Main Topics    Smoking status: Former Smoker     Types: Cigarettes     Quit date: 1/1/2003    Smokeless tobacco: Never Used    Alcohol use Yes      Comment: occassional    Drug use: No    Sexual activity: Not Asked     Other Topics Concern    None     Social History Narrative    None       Current Outpatient Prescriptions   Medication Sig Dispense Refill    amlodipine (NORVASC) 2.5 MG tablet Take 1 tablet (2.5 mg total) by mouth Daily. 90 tablet 1    bisoprolol (ZEBETA) 10 MG tablet Take 1 tablet (10 mg total) by mouth once daily. 90 tablet 1    COCONUT OIL ORAL Take 1 tablet by mouth Daily.      levothyroxine (SYNTHROID) 112 MCG tablet Take 1 tablet (112 mcg total) by mouth once daily. 90 tablet 1    ondansetron (ZOFRAN-ODT) 4 MG TbDL Take 1 tablet (4 mg total) by mouth every 8 (eight) hours as needed (nausea/vomiting). 12 tablet 0    UBIDECARENONE (COENZYME Q10) 60 mg Cap Take 1 capsule by mouth Daily.      albuterol 90 mcg/actuation inhaler Inhale 2 puffs into the lungs every 4 (four) hours as needed for Wheezing. 1 Inhaler 0    azithromycin (Z-NAKUL) 250 MG tablet Take 1 tablet (250 mg total) by mouth once daily. po on day 1 then 1 tab po on days 2-5 6 tablet 0     "promethazine-codeine 6.25-10 mg/5 ml (PHENERGAN WITH CODEINE) 6.25-10 mg/5 mL syrup Take 5 mLs by mouth every 4 (four) hours as needed. 118 mL 0     No current facility-administered medications for this visit.        Review of patient's allergies indicates:   Allergen Reactions    Lisinopril      Other reaction(s): Cough     Objective:    HPI     Follow-up    Additional comments: [facility] [place] visit           Cough    Additional comments: severe       Last edited by Daija Haque LPN on 2/6/2018  1:30 PM. (History)      Blood pressure 120/70, pulse 94, temperature 98.3 °F (36.8 °C), height 5' 3" (1.6 m), weight 67.1 kg (148 lb), SpO2 99 %. Body mass index is 26.22 kg/m².   Physical Exam   Constitutional: She is oriented to person, place, and time. She appears well-developed and well-nourished. She is cooperative. No distress.   HENT:   Head: Normocephalic and atraumatic.   Right Ear: Tympanic membrane normal.   Left Ear: Tympanic membrane normal.   Eyes: Conjunctivae, EOM and lids are normal. Pupils are equal, round, and reactive to light. Lids are everted and swept, no foreign bodies found. Right pupil is round and reactive. Left pupil is round and reactive.   Neck: Trachea normal and normal range of motion. Neck supple.   Cardiovascular: Normal rate, regular rhythm, S1 normal, S2 normal, normal heart sounds and intact distal pulses.    Pulmonary/Chest: Effort normal. No apnea and no tachypnea. No respiratory distress. She has no decreased breath sounds. She has wheezes in the right middle field, the right lower field, the left middle field and the left lower field. She has no rhonchi. She has no rales.   Abdominal: Soft. Bowel sounds are normal. There is no rigidity and no guarding.   Musculoskeletal: Normal range of motion.   Lymphadenopathy:     She has cervical adenopathy.        Right cervical: Superficial cervical adenopathy present.        Left cervical: Superficial cervical adenopathy present.     She has no " axillary adenopathy.   Neurological: She is alert and oriented to person, place, and time.   Skin: Skin is warm and dry. Capillary refill takes less than 2 seconds.   Psychiatric: She has a normal mood and affect. Her behavior is normal. Judgment and thought content normal.   Nursing note and vitals reviewed.          Assessment:       1. Wheezing    2. Cough    3. Community acquired pneumonia, unspecified laterality        Plan:       Yuidth was seen today for follow-up and cough.    Diagnoses and all orders for this visit:    Wheezing  -     albuterol 90 mcg/actuation inhaler; Inhale 2 puffs into the lungs every 4 (four) hours as needed for Wheezing.    Cough  -     promethazine-codeine 6.25-10 mg/5 ml (PHENERGAN WITH CODEINE) 6.25-10 mg/5 mL syrup; Take 5 mLs by mouth every 4 (four) hours as needed.    Community acquired pneumonia, unspecified laterality  -     azithromycin (Z-NAKUL) 250 MG tablet; Take 1 tablet (250 mg total) by mouth once daily. po on day 1 then 1 tab po on days 2-5       Medications reviewed and reconciled from ER discharge.  Follow up if no improvement in 1 week.

## 2018-03-09 ENCOUNTER — OFFICE VISIT (OUTPATIENT)
Dept: FAMILY MEDICINE | Facility: CLINIC | Age: 63
End: 2018-03-09
Payer: COMMERCIAL

## 2018-03-09 VITALS
DIASTOLIC BLOOD PRESSURE: 84 MMHG | HEART RATE: 62 BPM | WEIGHT: 148 LBS | SYSTOLIC BLOOD PRESSURE: 124 MMHG | HEIGHT: 63 IN | OXYGEN SATURATION: 99 % | BODY MASS INDEX: 26.22 KG/M2

## 2018-03-09 DIAGNOSIS — I10 BENIGN HYPERTENSION: ICD-10-CM

## 2018-03-09 DIAGNOSIS — E78.2 MULTIPLE-TYPE HYPERLIPIDEMIA: ICD-10-CM

## 2018-03-09 DIAGNOSIS — E03.9 ACQUIRED HYPOTHYROIDISM: Primary | ICD-10-CM

## 2018-03-09 DIAGNOSIS — M77.8 RIGHT HAND TENDONITIS: ICD-10-CM

## 2018-03-09 PROCEDURE — 99214 OFFICE O/P EST MOD 30 MIN: CPT | Mod: ,,, | Performed by: NURSE PRACTITIONER

## 2018-03-09 PROCEDURE — 3074F SYST BP LT 130 MM HG: CPT | Mod: ,,, | Performed by: NURSE PRACTITIONER

## 2018-03-09 PROCEDURE — 3079F DIAST BP 80-89 MM HG: CPT | Mod: ,,, | Performed by: NURSE PRACTITIONER

## 2018-03-09 RX ORDER — LEVOTHYROXINE SODIUM 112 UG/1
112 TABLET ORAL DAILY
Qty: 90 TABLET | Refills: 1 | Status: SHIPPED | OUTPATIENT
Start: 2018-03-09 | End: 2018-10-04 | Stop reason: SDUPTHER

## 2018-03-09 RX ORDER — NAPROXEN SODIUM 220 MG
220 TABLET ORAL 2 TIMES DAILY WITH MEALS
COMMUNITY
Start: 2018-03-09 | End: 2018-10-04

## 2018-03-09 RX ORDER — BISOPROLOL FUMARATE 10 MG/1
10 TABLET, FILM COATED ORAL DAILY
Qty: 90 TABLET | Refills: 1 | Status: SHIPPED | OUTPATIENT
Start: 2018-03-09 | End: 2018-10-04 | Stop reason: SDUPTHER

## 2018-03-09 RX ORDER — AMLODIPINE BESYLATE 2.5 MG/1
2.5 TABLET ORAL DAILY
Qty: 90 TABLET | Refills: 1 | Status: SHIPPED | OUTPATIENT
Start: 2018-03-09 | End: 2018-10-04 | Stop reason: SDUPTHER

## 2018-03-09 NOTE — PROGRESS NOTES
Subjective:       Patient ID: Yudith Haider is a 62 y.o. female.    Chief Complaint: Hypertension and Edema (rt. hand, by thumb, has been going on for a couple of mos.)    Hypertension   This is a chronic problem. The current episode started more than 1 year ago. The problem has been waxing and waning since onset. The problem is controlled. Pertinent negatives include no anxiety, blurred vision, chest pain, headaches, malaise/fatigue, neck pain, orthopnea, palpitations, peripheral edema, PND, shortness of breath or sweats. There are no associated agents to hypertension. Risk factors for coronary artery disease include stress, dyslipidemia, post-menopausal state and sedentary lifestyle. Past treatments include beta blockers and calcium channel blockers. The current treatment provides significant improvement. Compliance problems include diet and exercise.  There is no history of angina, kidney disease, CAD/MI, CVA, heart failure, left ventricular hypertrophy, PVD or retinopathy. Identifiable causes of hypertension include a thyroid problem. There is no history of chronic renal disease.   Hyperlipidemia   This is a chronic problem. The current episode started more than 1 year ago. The problem is controlled. She has no history of chronic renal disease, diabetes, hypothyroidism, liver disease, obesity or nephrotic syndrome. Factors aggravating her hyperlipidemia include fatty foods. Pertinent negatives include no chest pain, focal sensory loss, focal weakness, leg pain, myalgias or shortness of breath. Current antihyperlipidemic treatment includes diet change and exercise. The current treatment provides mild improvement of lipids. Compliance problems include adherence to diet and adherence to exercise.  Risk factors for coronary artery disease include dyslipidemia, hypertension, post-menopausal, a sedentary lifestyle and stress.   Thyroid Problem   Presents for follow-up visit. Patient reports no anxiety, cold  intolerance, constipation, depressed mood, diaphoresis, diarrhea, dry skin, fatigue, hair loss, heat intolerance, hoarse voice, leg swelling, menstrual problem, nail problem, palpitations, tremors, visual change, weight gain or weight loss. The symptoms have been stable. Her past medical history is significant for hyperlipidemia. There is no history of diabetes or heart failure.   Hand Pain    The incident occurred more than 1 week ago (1 month ago patient picked up chicken feed 100 lb back and strained hand). The incident occurred at home. The injury mechanism was twisted (Lifted 100 lb bag). The pain is present in the right fingers and right hand (right thumb). The quality of the pain is described as aching. The pain does not radiate. The pain is at a severity of 5/10. The pain is moderate. The pain has been constant since the incident. Pertinent negatives include no chest pain, muscle weakness, numbness or tingling. The symptoms are aggravated by movement. She has tried ice, immobilization, NSAIDs and rest for the symptoms. The treatment provided mild relief.     Review of Systems   Constitutional: Negative for activity change, appetite change, diaphoresis, fatigue, fever, malaise/fatigue, weight gain and weight loss.   HENT: Negative for congestion, ear discharge, ear pain, hoarse voice, sore throat, trouble swallowing and voice change.    Eyes: Negative for blurred vision, photophobia, pain, discharge and visual disturbance.   Respiratory: Negative for cough, chest tightness and shortness of breath.    Cardiovascular: Negative for chest pain, palpitations, orthopnea and PND.        Hypertension, hyperlipidemia   Gastrointestinal: Negative for abdominal pain, constipation, diarrhea, nausea and vomiting.   Endocrine: Negative for cold intolerance and heat intolerance.        Hypothyroid   Genitourinary: Negative for difficulty urinating, dysuria and menstrual problem.   Musculoskeletal: Positive for arthralgias  (hand pain right hand, thumb). Negative for gait problem, myalgias and neck pain.   Skin: Negative for rash.   Allergic/Immunologic: Negative for immunocompromised state.   Neurological: Negative for tingling, tremors, focal weakness, speech difficulty, numbness and headaches.   Psychiatric/Behavioral: Negative for confusion, self-injury and suicidal ideas. The patient is not nervous/anxious.        Past Medical History:   Diagnosis Date    Hyperlipidemia     Hypertension     Hypothyroidism       Past Surgical History:   Procedure Laterality Date    HYSTERECTOMY  1981    THYROIDECTOMY  1993       Family History   Problem Relation Age of Onset    Hypertension Mother     Diabetes Mother     Stroke Father     Hypertension Father        Social History     Social History    Marital status:      Spouse name: N/A    Number of children: N/A    Years of education: N/A     Social History Main Topics    Smoking status: Former Smoker     Types: Cigarettes     Quit date: 1/1/2003    Smokeless tobacco: Never Used    Alcohol use Yes      Comment: occassional    Drug use: No    Sexual activity: Not Asked     Other Topics Concern    None     Social History Narrative    None       Current Outpatient Prescriptions   Medication Sig Dispense Refill    albuterol 90 mcg/actuation inhaler Inhale 2 puffs into the lungs every 4 (four) hours as needed for Wheezing. 1 Inhaler 0    amLODIPine (NORVASC) 2.5 MG tablet Take 1 tablet (2.5 mg total) by mouth Daily. 90 tablet 1    bisoprolol (ZEBETA) 10 MG tablet Take 1 tablet (10 mg total) by mouth once daily. 90 tablet 1    COCONUT OIL ORAL Take 1 tablet by mouth Daily.      levothyroxine (SYNTHROID) 112 MCG tablet Take 1 tablet (112 mcg total) by mouth once daily. 90 tablet 1    ondansetron (ZOFRAN-ODT) 4 MG TbDL Take 1 tablet (4 mg total) by mouth every 8 (eight) hours as needed (nausea/vomiting). 12 tablet 0    UBIDECARENONE (COENZYME Q10) 60 mg Cap Take 1  "capsule by mouth Daily.      naproxen sodium (ANAPROX) 220 MG tablet Take 1 tablet (220 mg total) by mouth 2 (two) times daily with meals.       No current facility-administered medications for this visit.        Review of patient's allergies indicates:   Allergen Reactions    Lisinopril      Other reaction(s): Cough     Objective:    HPI     Edema    Additional comments: rt. hand, by thumb, has been going on for a couple   of mos.       Last edited by Daija Haque LPN on 3/9/2018  8:19 AM. (History)      Blood pressure 124/84, pulse 62, height 5' 3" (1.6 m), weight 67.1 kg (148 lb), SpO2 99 %. Body mass index is 26.22 kg/m².   Physical Exam   Constitutional: She is oriented to person, place, and time. She appears well-developed and well-nourished. She is cooperative. No distress.   HENT:   Head: Normocephalic and atraumatic.   Right Ear: Tympanic membrane normal.   Left Ear: Tympanic membrane normal.   Nose: Nose normal.   Mouth/Throat: Oropharynx is clear and moist.   Eyes: Conjunctivae, EOM and lids are normal. Pupils are equal, round, and reactive to light. Lids are everted and swept, no foreign bodies found. Right pupil is round and reactive. Left pupil is round and reactive.   Neck: Trachea normal and normal range of motion. Neck supple. No thyromegaly present.   Cardiovascular: Normal rate, regular rhythm, S1 normal, S2 normal, normal heart sounds and intact distal pulses.    No murmur heard.  Pulmonary/Chest: Effort normal and breath sounds normal. No respiratory distress.   Abdominal: Soft. Bowel sounds are normal. There is no rigidity and no guarding.   Musculoskeletal:        Right hand: She exhibits decreased range of motion, tenderness and swelling (mild). She exhibits normal capillary refill and no deformity.        Hands:  Lymphadenopathy:     She has no cervical adenopathy.     She has no axillary adenopathy.   Neurological: She is alert and oriented to person, place, and time.   Skin: Skin is " warm and dry. Capillary refill takes less than 2 seconds.   Psychiatric: She has a normal mood and affect. Her behavior is normal. Judgment and thought content normal.   Nursing note and vitals reviewed.          Assessment:       1. Acquired hypothyroidism    2. Benign hypertension    3. Multiple-type hyperlipidemia    4. Right hand tendonitis    5. BMI 26.0-26.9,adult        Plan:       Yudith was seen today for hypertension and edema.    Diagnoses and all orders for this visit:    Acquired hypothyroidism  -     levothyroxine (SYNTHROID) 112 MCG tablet; Take 1 tablet (112 mcg total) by mouth once daily.  -     TSH; Future  -     TSH    Benign hypertension  -     amLODIPine (NORVASC) 2.5 MG tablet; Take 1 tablet (2.5 mg total) by mouth Daily.  -     bisoprolol (ZEBETA) 10 MG tablet; Take 1 tablet (10 mg total) by mouth once daily.  -     Comprehensive metabolic panel; Future  -     CBC auto differential; Future  -     Comprehensive metabolic panel  -     CBC auto differential  -Lifestyle changes: Reduce the amount of salt in your diet; Lose weight; Avoid drinking too much alcohol; Exercise at least 30 minutes per day most days of the week.  Continue current medications and home BP monitoring.     Multiple-type hyperlipidemia  -     Lipid panel; Future  -     Lipid panel  -Limit red meat, butter, fried foods, cheese, and other foods that have a lot of saturated fat. Consume more: lean meats, fish, fruits, vegetables, whole grains, beans, lentils, and nuts.  Weight loss, and 30-45 min of cardiovascular exercise daily.     Right hand tendonitis  -     naproxen sodium (ANAPROX) 220 MG tablet; Take 1 tablet (220 mg total) by mouth 2 (two) times daily with meals.  -Wear brace on hand with activity and when sleeping at night.  Follow up for this in 6 weeks if no improvement.    BMI 26.0-26.9,adult       Obtain labs within the next week.  Will call with labs.  Follow up in 6 months for hypertension, hypothyroid, and  hyperlipidemia.

## 2018-03-09 NOTE — PATIENT INSTRUCTIONS
Taking Your Blood Pressure  Blood pressure is the force of blood against the artery wall as it moves from the heart through the blood vessels. You can take your own blood pressure reading using a digital monitor. Take your readings the same each time, using the same arm. Take readings as often as your healthcare provider instructs.  About blood pressure monitors  Blood pressure monitors are designed for certain ages and cases. You can find monitors for older adults, for pregnant women, and for children. Make sure the one you choose is the right one for your age and situation.  The American Heart Association recommends an automatic cuff monitor that fits on your upper arm (bicep). The cuff should fit your arm size. A cuff thats too large or too small will not give an accurate reading. Measure around your upper arm to find your size.  Monitors that attach to your finger or wrist are not as accurate as monitors for your upper arm.  Ask your healthcare provider for help in choosing a monitor. Bring your monitor to your next provider visit if you need help in using it the correct way.  The steps below are general instructions for using an automatic digital monitor.  Step 1. Relax    · Take your blood pressure at the same time every day, such as in the morning or evening, or at the time your healthcare provider recommends.  · Wait at least a half-hour after smoking, eating, or exercising. Don't drink coffee, tea, soda, or other caffeinated beverages before checking your blood pressure.  · Sit comfortably at a table with both feet on the floor. Do not cross your legs or feet. Place the monitor near you.  · Rest for a few minutes before you begin.  Step 2. Wrap the cuff    · Place your arm on the table, palm up. Your arm should be at the level of your heart. Wrap the cuff around your upper arm, just above your elbow. Its best done on bare skin, not over clothing. Most cuffs will indicate where the brachial artery (the  "blood vessel in the middle of the arm at the inner side of the elbow) should line up with the cuff. Look in your monitor's instruction booklet for an illustration. You can also bring your cuff to your healthcare provider and have them show you how to correctly place the cuff.  Step 3. Inflate the cuff    · Push the button that starts the pump.  · The cuff will tighten, then loosen.  · The numbers will change. When they stop changing, your blood pressure reading will appear.  · Take 2 or 3 readings one minute apart.  Step 4. Write down the results of each reading    · Write down your blood pressure numbers for each reading. Note the date and time. Keep your results in one place, such as a notebook. Even if your monitor has a built-in memory, keep a hard copy of the readings.  · Remove the cuff from your arm. Turn off the machine.  · Bring your blood pressure records with your healthcare providers at each visit.  · If you start a new blood pressure medicine, note the day you started the new medicine. Also note the day if you change the dose of your medicine. This information goes on your blood pressure recording sheet. This will help your healthcare provider monitor how well the medicine changes are working.  · Ask your healthcare provider what numbers should prompt you to call him or her. Also ask what numbers should prompt you to get help right away.  Date Last Reviewed: 11/1/2016 © 2000-2017 [employer]. [address] [place] [id number]. All rights reserved. This information is not intended as a substitute for professional medical care. Always follow your healthcare professional's instructions.        Facts About Dietary Fat     Olive oil is a good source of unsaturated fat.     Eating less saturated and trans fat is one of the best things you can do for your heart. Start by finding out which fats are better to use. Then always try to use as little "bad" fat as you can.  Why eat less " fat?  · Cutting down on the fat you eat can lower your blood cholesterol levels. This may help prevent clogged arteries from buildup of plaque.  · A low-fat diet can help you lose excess weight. Doing so can lower your blood pressure and reduce your chances of getting diabetes.  · A low-fat diet reduces your risk for stroke and for some cancers.  Unsaturated fat is most healthy  · When you must add fat, use unsaturated fat.  · Unsaturated fats come from plants. They include olive, canola, peanut, corn, avocado, safflower, and sunflower oils.  · Liquid (squeezable) margarine is also mostly unsaturated fat.  · In moderate amounts, unsaturated fat can even be good for your heart.  Saturated fat is less healthy  · Avoid eating saturated fat because it raises your blood cholesterol levels.  · Most saturated fat comes from animals. Foods such as butter, lard, cheese, cream, whole milk, and fatty cuts of meat are high in saturated fat.  · Some oils, such as palm and coconut oils, are also saturated fats.  Trans fat is least healthy  · Also avoid trans fat whenever possible. Even if it's not listed on the food label, look for it in the ingredients in the form of hydrogenated or partially hydrogenated oils.  · This is found in snack foods, shortening, french fries, and stick margarines.  Add flavor without fat  · Sprinkle herbs on fish, chicken, and meat, and in soups.  · Try herbs, lemon juice, or flavored vinegar on vegetables.  · Add chopped onions, garlic, and peppers to flavor beans and rice.   Date Last Reviewed: 5/11/2015  © 6748-5137 [employer]. [address] [place] [address], [place] [address]. All rights reserved. This information is not intended as a substitute for professional medical care. Always follow your healthcare professional's instructions.        Eating Heart-Healthy Foods  Eating has a big impact on your heart health. In fact, eating healthier can improve several of your heart risks at once. For  instance, it helps you manage weight, cholesterol, and blood pressure. Here are ideas to help you make heart-healthy changes without giving up all the foods and flavors you love.  Getting started  · Talk with your health care provider about eating plans, such as the DASH or Mediterranean diet. You may also be referred to a dietitian.  · Change a few things at a time. Give yourself time to get used to a few eating changes before adding more.  · Work to create a tasty, healthy eating plan that you can stick to for the rest of your life.    Goals for healthy eating  Below are some tips to improve your eating habits:  · Limit saturated fats and trans fats. Saturated fats raise your levels of cholesterol, so keep these fats to a minimum. They are found in foods such as fatty meats, whole milk, cheese, and palm and coconut oils. Avoid trans fats because they lower good cholesterol as well as raise bad cholesterol. Trans fats are most often found in processed foods.  · Reduce sodium (salt) intake. Eating too much salt may increase your blood pressure. Limit your sodium intake to 2,300 milligrams (mg) per day, or less if your health care provider recommends it. Dining out less often and eating fewer processed foods are two great ways to decrease the amount of salt you consume.  · Managing calories. A calorie is a unit of energy. Your body burns calories for fuel, but if you eat more calories than your body burns, the extras are stored as fat. Your health care provider can help you create a diet plan to manage your calories. This will likely include eating healthier foods as well as exercising regularly. To help you track your progress, keep a diary to record what you eat and how often you exercise.  Choose the right foods  Aim to make these foods staples of your diet. If you have diabetes, you may have different recommendations than what is listed here:  · Fruits and vegetable provide plenty of nutrients without a lot of  calories. At meals, fill half your plate with these foods. Split the other half of your plate between whole grains and lean protein.  · Whole grains are high in fiber and rich in vitamins and nutrients. Good choices include whole-wheat bread, pasta, and brown rice.  · Lean proteins give you nutrition with less fat. Good choices include fish, skinless chicken, and beans.  · Low-fat or nonfat dairy provides nutrients without a lot of fat. Try low-fat or nonfat milk, cheese, or yogurt.  · Healthy fats can be good for you in small amounts. These are unsaturated fats, such as olive oil, nuts, and fish. Try to have at least 2 servings per week of fatty fish such as salmon, sardines, mackerel, rainbow trout, and albacore tuna. These contain omega-3 fatty acids, which are good for your heart. Flaxseed is another source of a heart-healthy fat.  More on heart healthy eating    Read food labels  Healthy eating starts at the grocery store. Be sure to pay attention to food labels on packaged foods. Look for products that are high in fiber and protein, and low in saturated fat, cholesterol, and sodium. Avoid products that contain trans fat. And pay close attention to serving size. For instance, if you plan to eat two servings, double all the numbers on the label.  Prepare food right  A key part of healthy cooking is cutting down on added fat and salt. Look on the internet for lower-fat, lower-sodium recipes. Also, try these tips:  · Remove fat from meat and skin from poultry before cooking.  · Skim fat from the surface of soups and sauces.  · Broil, boil, bake, steam, grill, and microwave food without added fats.  · Choose ingredients that spice up your food without adding calories, fat, or sodium. Try these items: horseradish, hot sauce, lemon, mustard, nonfat salad dressings, and vinegar. For salt-free herbs and spices, try basil, cilantro, cinnamon, pepper, and rosemary.  Date Last Reviewed: 6/25/2015  © 1066-3346 [employer]. [address] [place] [address], [place] [address]. All rights reserved. This information is not intended as a substitute for professional medical care. Always follow your healthcare professional's instructions.

## 2018-03-27 LAB
ALBUMIN SERPL-MCNC: 4.7 G/DL (ref 3.6–5.1)
ALBUMIN/GLOB SERPL: 1.8 (CALC) (ref 1–2.5)
ALP SERPL-CCNC: 71 U/L (ref 33–130)
ALT SERPL-CCNC: 11 U/L (ref 6–29)
AST SERPL-CCNC: 13 U/L (ref 10–35)
BASOPHILS # BLD AUTO: 39 CELLS/UL (ref 0–200)
BASOPHILS NFR BLD AUTO: 0.9 %
BILIRUB SERPL-MCNC: 0.4 MG/DL (ref 0.2–1.2)
BUN SERPL-MCNC: 10 MG/DL (ref 7–25)
BUN/CREAT SERPL: NORMAL (CALC) (ref 6–22)
CALCIUM SERPL-MCNC: 9.4 MG/DL (ref 8.6–10.4)
CHLORIDE SERPL-SCNC: 102 MMOL/L (ref 98–110)
CHOLEST SERPL-MCNC: 209 MG/DL
CHOLEST/HDLC SERPL: 2.9 (CALC)
CO2 SERPL-SCNC: 27 MMOL/L (ref 20–31)
CREAT SERPL-MCNC: 0.58 MG/DL (ref 0.5–0.99)
EOSINOPHIL # BLD AUTO: 82 CELLS/UL (ref 15–500)
EOSINOPHIL NFR BLD AUTO: 1.9 %
ERYTHROCYTE [DISTWIDTH] IN BLOOD BY AUTOMATED COUNT: 12.3 % (ref 11–15)
GFR SERPL CREATININE-BSD FRML MDRD: 98 ML/MIN/1.73M2
GLOBULIN SER CALC-MCNC: 2.6 G/DL (CALC) (ref 1.9–3.7)
GLUCOSE SERPL-MCNC: 81 MG/DL (ref 65–99)
HCT VFR BLD AUTO: 36.5 % (ref 35–45)
HDLC SERPL-MCNC: 72 MG/DL
HGB BLD-MCNC: 12.1 G/DL (ref 11.7–15.5)
LDLC SERPL CALC-MCNC: 108 MG/DL (CALC)
LYMPHOCYTES # BLD AUTO: 1393 CELLS/UL (ref 850–3900)
LYMPHOCYTES NFR BLD AUTO: 32.4 %
MCH RBC QN AUTO: 32.6 PG (ref 27–33)
MCHC RBC AUTO-ENTMCNC: 33.2 G/DL (ref 32–36)
MCV RBC AUTO: 98.4 FL (ref 80–100)
MONOCYTES # BLD AUTO: 344 CELLS/UL (ref 200–950)
MONOCYTES NFR BLD AUTO: 8 %
NEUTROPHILS # BLD AUTO: 2442 CELLS/UL (ref 1500–7800)
NEUTROPHILS NFR BLD AUTO: 56.8 %
NONHDLC SERPL-MCNC: 137 MG/DL (CALC)
PLATELET # BLD AUTO: 255 THOUSAND/UL (ref 140–400)
PMV BLD REES-ECKER: 10.3 FL (ref 7.5–12.5)
POTASSIUM SERPL-SCNC: 4.3 MMOL/L (ref 3.5–5.3)
PROT SERPL-MCNC: 7.3 G/DL (ref 6.1–8.1)
RBC # BLD AUTO: 3.71 MILLION/UL (ref 3.8–5.1)
SODIUM SERPL-SCNC: 139 MMOL/L (ref 135–146)
TRIGL SERPL-MCNC: 170 MG/DL
TSH SERPL-ACNC: 1.18 MIU/L (ref 0.4–4.5)
WBC # BLD AUTO: 4.3 THOUSAND/UL (ref 3.8–10.8)

## 2018-04-03 ENCOUNTER — TELEPHONE (OUTPATIENT)
Dept: FAMILY MEDICINE | Facility: CLINIC | Age: 63
End: 2018-04-03

## 2018-04-03 DIAGNOSIS — E78.5 MILD HYPERLIPIDEMIA: Primary | ICD-10-CM

## 2018-04-03 RX ORDER — ROSUVASTATIN CALCIUM 5 MG/1
5 TABLET, COATED ORAL NIGHTLY
Qty: 30 TABLET | Refills: 5 | Status: SHIPPED | OUTPATIENT
Start: 2018-04-03 | End: 2018-10-04

## 2018-04-03 NOTE — TELEPHONE ENCOUNTER
Patient's labs show cholesterol is elevated.  Improved from 4 months ago but still elevated.  Patient should start on crestor once daily at bedtime.

## 2018-07-23 ENCOUNTER — TELEPHONE (OUTPATIENT)
Dept: FAMILY MEDICINE | Facility: CLINIC | Age: 63
End: 2018-07-23

## 2018-07-23 NOTE — TELEPHONE ENCOUNTER
----- Message from HIRO Castro sent at 7/23/2018 11:49 AM CDT -----  Negative mammogram.  Repeat in 1 year

## 2018-10-04 ENCOUNTER — OFFICE VISIT (OUTPATIENT)
Dept: FAMILY MEDICINE | Facility: CLINIC | Age: 63
End: 2018-10-04
Payer: COMMERCIAL

## 2018-10-04 VITALS
WEIGHT: 151.81 LBS | HEART RATE: 65 BPM | OXYGEN SATURATION: 98 % | HEIGHT: 63 IN | DIASTOLIC BLOOD PRESSURE: 78 MMHG | SYSTOLIC BLOOD PRESSURE: 122 MMHG | BODY MASS INDEX: 26.9 KG/M2

## 2018-10-04 DIAGNOSIS — E78.2 MULTIPLE-TYPE HYPERLIPIDEMIA: ICD-10-CM

## 2018-10-04 DIAGNOSIS — I10 BENIGN HYPERTENSION: ICD-10-CM

## 2018-10-04 DIAGNOSIS — E03.9 ACQUIRED HYPOTHYROIDISM: Primary | ICD-10-CM

## 2018-10-04 DIAGNOSIS — E66.3 OVERWEIGHT: ICD-10-CM

## 2018-10-04 PROCEDURE — 3078F DIAST BP <80 MM HG: CPT | Mod: ,,, | Performed by: NURSE PRACTITIONER

## 2018-10-04 PROCEDURE — 3074F SYST BP LT 130 MM HG: CPT | Mod: ,,, | Performed by: NURSE PRACTITIONER

## 2018-10-04 PROCEDURE — 3008F BODY MASS INDEX DOCD: CPT | Mod: ,,, | Performed by: NURSE PRACTITIONER

## 2018-10-04 PROCEDURE — 99214 OFFICE O/P EST MOD 30 MIN: CPT | Mod: ,,, | Performed by: NURSE PRACTITIONER

## 2018-10-04 RX ORDER — AMLODIPINE BESYLATE 2.5 MG/1
2.5 TABLET ORAL DAILY
Qty: 90 TABLET | Refills: 1 | Status: SHIPPED | OUTPATIENT
Start: 2018-10-04 | End: 2019-04-04 | Stop reason: SDUPTHER

## 2018-10-04 RX ORDER — LEVOTHYROXINE SODIUM 112 UG/1
112 TABLET ORAL DAILY
Qty: 90 TABLET | Refills: 1 | Status: SHIPPED | OUTPATIENT
Start: 2018-10-04 | End: 2019-04-04 | Stop reason: SDUPTHER

## 2018-10-04 RX ORDER — BISOPROLOL FUMARATE 10 MG/1
10 TABLET, FILM COATED ORAL DAILY
Qty: 90 TABLET | Refills: 1 | Status: SHIPPED | OUTPATIENT
Start: 2018-10-04 | End: 2019-04-04 | Stop reason: SDUPTHER

## 2018-10-04 NOTE — PATIENT INSTRUCTIONS
Taking Your Blood Pressure  Blood pressure is the force of blood against the artery wall as it moves from the heart through the blood vessels. You can take your own blood pressure reading using a digital monitor. Take your readings the same each time, using the same arm. Take readings as often as your healthcare provider instructs.  About blood pressure monitors  Blood pressure monitors are designed for certain ages and cases. You can find monitors for older adults, for pregnant women, and for children. Make sure the one you choose is the right one for your age and situation.  The American Heart Association recommends an automatic cuff monitor that fits on your upper arm (bicep). The cuff should fit your arm size. A cuff thats too large or too small will not give an accurate reading. Measure around your upper arm to find your size.  Monitors that attach to your finger or wrist are not as accurate as monitors for your upper arm.  Ask your healthcare provider for help in choosing a monitor. Bring your monitor to your next provider visit if you need help in using it the correct way.  The steps below are general instructions for using an automatic digital monitor.  Step 1. Relax    · Take your blood pressure at the same time every day, such as in the morning or evening, or at the time your healthcare provider recommends.  · Wait at least a half-hour after smoking, eating, or exercising. Don't drink coffee, tea, soda, or other caffeinated beverages before checking your blood pressure.  · Sit comfortably at a table with both feet on the floor. Do not cross your legs or feet. Place the monitor near you.  · Rest for a few minutes before you begin.  Step 2. Wrap the cuff    · Place your arm on the table, palm up. Your arm should be at the level of your heart. Wrap the cuff around your upper arm, just above your elbow. Its best done on bare skin, not over clothing. Most cuffs will indicate where the brachial artery (the  "blood vessel in the middle of the arm at the inner side of the elbow) should line up with the cuff. Look in your monitor's instruction booklet for an illustration. You can also bring your cuff to your healthcare provider and have them show you how to correctly place the cuff.  Step 3. Inflate the cuff    · Push the button that starts the pump.  · The cuff will tighten, then loosen.  · The numbers will change. When they stop changing, your blood pressure reading will appear.  · Take 2 or 3 readings one minute apart.  Step 4. Write down the results of each reading    · Write down your blood pressure numbers for each reading. Note the date and time. Keep your results in one place, such as a notebook. Even if your monitor has a built-in memory, keep a hard copy of the readings.  · Remove the cuff from your arm. Turn off the machine.  · Bring your blood pressure records with your healthcare providers at each visit.  · If you start a new blood pressure medicine, note the day you started the new medicine. Also note the day if you change the dose of your medicine. This information goes on your blood pressure recording sheet. This will help your healthcare provider monitor how well the medicine changes are working.  · Ask your healthcare provider what numbers should prompt you to call him or her. Also ask what numbers should prompt you to get help right away.  Date Last Reviewed: 11/1/2016 © 2000-2017 [employer]. [address] [place] [address]. All rights reserved. This information is not intended as a substitute for professional medical care. Always follow your healthcare professional's instructions.        Facts About Dietary Fat     Olive oil is a good source of unsaturated fat.     Eating less saturated and trans fat is one of the best things you can do for your heart. Start by finding out which fats are better to use. Then always try to use as little "bad" fat as you can.  Why eat less " fat?  · Cutting down on the fat you eat can lower your blood cholesterol levels. This may help prevent clogged arteries from buildup of plaque.  · A low-fat diet can help you lose excess weight. Doing so can lower your blood pressure and reduce your chances of getting diabetes.  · A low-fat diet reduces your risk for stroke and for some cancers.  Unsaturated fat is most healthy  · When you must add fat, use unsaturated fat.  · Unsaturated fats come from plants. They include olive, canola, peanut, corn, avocado, safflower, and sunflower oils.  · Liquid (squeezable) margarine is also mostly unsaturated fat.  · In moderate amounts, unsaturated fat can even be good for your heart.  Saturated fat is less healthy  · Avoid eating saturated fat because it raises your blood cholesterol levels.  · Most saturated fat comes from animals. Foods such as butter, lard, cheese, cream, whole milk, and fatty cuts of meat are high in saturated fat.  · Some oils, such as palm and coconut oils, are also saturated fats.  Trans fat is least healthy  · Also avoid trans fat whenever possible. Even if it's not listed on the food label, look for it in the ingredients in the form of hydrogenated or partially hydrogenated oils.  · This is found in snack foods, shortening, french fries, and stick margarines.  Add flavor without fat  · Sprinkle herbs on fish, chicken, and meat, and in soups.  · Try herbs, lemon juice, or flavored vinegar on vegetables.  · Add chopped onions, garlic, and peppers to flavor beans and rice.   Date Last Reviewed: 5/11/2015  © 9929-4415 [employer]. [address] [place] [address], [place] [address]. All rights reserved. This information is not intended as a substitute for professional medical care. Always follow your healthcare professional's instructions.

## 2018-10-04 NOTE — PROGRESS NOTES
Subjective:       Patient ID: Yudith Haider is a 63 y.o. female.    Chief Complaint: Hyperlipidemia (6 mo. f/u) and Thyroid Problem (6 mo. f/u)    Patient presents today for follow-up on chronic conditions of hypertension, hyperlipidemia, and hypothyroidism. Patient is stable and doing well on current medications. Patient has stopped taking Crestor prescribed due to side effects. Patient states she just returned from [place] on vacation for a few weeks with her .      Hypertension   This is a chronic problem. The current episode started more than 1 year ago. The problem has been waxing and waning since onset. The problem is controlled. Pertinent negatives include no anxiety, blurred vision, chest pain, headaches, malaise/fatigue, neck pain, orthopnea, palpitations, peripheral edema, PND, shortness of breath or sweats. There are no associated agents to hypertension. Risk factors for coronary artery disease include stress, dyslipidemia, post-menopausal state and sedentary lifestyle. Past treatments include beta blockers and calcium channel blockers. The current treatment provides significant improvement. Compliance problems include diet and exercise.  There is no history of angina, kidney disease, CAD/MI, CVA, heart failure, left ventricular hypertrophy, PVD or retinopathy. Identifiable causes of hypertension include a thyroid problem. There is no history of chronic renal disease.   Hyperlipidemia   This is a chronic problem. The current episode started more than 1 year ago. The problem is controlled. She has no history of chronic renal disease, diabetes, hypothyroidism, liver disease, obesity or nephrotic syndrome. Factors aggravating her hyperlipidemia include fatty foods. Pertinent negatives include no chest pain, focal sensory loss, focal weakness, leg pain, myalgias or shortness of breath. Current antihyperlipidemic treatment includes diet change and exercise. The current treatment provides mild  improvement of lipids. Compliance problems include adherence to diet, adherence to exercise and medication side effects.  Risk factors for coronary artery disease include dyslipidemia, hypertension, post-menopausal, a sedentary lifestyle and stress.   Thyroid Problem   Presents for follow-up visit. Patient reports no anxiety, cold intolerance, constipation, depressed mood, diaphoresis, diarrhea, dry skin, fatigue, hair loss, heat intolerance, hoarse voice, leg swelling, menstrual problem, nail problem, palpitations, tremors, visual change, weight gain or weight loss. The symptoms have been stable. Her past medical history is significant for hyperlipidemia. There is no history of diabetes or heart failure.     Review of Systems   Constitutional: Negative for activity change, appetite change, diaphoresis, fatigue, fever, malaise/fatigue, weight gain and weight loss.        Overweight   HENT: Negative for congestion, ear discharge, ear pain, hoarse voice, sore throat, trouble swallowing and voice change.    Eyes: Negative for blurred vision, photophobia, pain, discharge and visual disturbance.   Respiratory: Negative for cough, chest tightness and shortness of breath.    Cardiovascular: Negative for chest pain, palpitations, orthopnea and PND.        Hypertension, hyperlipidemia   Gastrointestinal: Negative for abdominal pain, constipation, diarrhea, nausea and vomiting.   Endocrine: Negative for cold intolerance and heat intolerance.        Hypothyroid   Genitourinary: Negative for difficulty urinating, dysuria and menstrual problem.   Musculoskeletal: Negative for gait problem, myalgias and neck pain.   Skin: Negative for rash.   Allergic/Immunologic: Negative for immunocompromised state.   Neurological: Negative for tremors, focal weakness, speech difficulty and headaches.   Psychiatric/Behavioral: Negative for confusion, self-injury and suicidal ideas. The patient is not nervous/anxious.        Past Medical  History:   Diagnosis Date    Hyperlipidemia     Hypertension     Hypothyroidism       Past Surgical History:   Procedure Laterality Date    HYSTERECTOMY  1981    INJECTION-STEROID-EPIDURAL-CERVICAL N/A 8/22/2017    [facility]    INJECTION-STEROID-EPIDURAL-CERVICAL N/A 7/14/2017    [facility]    THYROIDECTOMY  1993       Family History   Problem Relation Age of Onset    Hypertension Mother     Diabetes Mother     Stroke Father     Hypertension Father        Social History     Socioeconomic History    Marital status:      Spouse name: None    Number of children: None    Years of education: None    Highest education level: None   Social Needs    Financial resource strain: None    Food insecurity - worry: None    Food insecurity - inability: None    Transportation needs - medical: None    Transportation needs - non-medical: None   Occupational History    None   Tobacco Use    Smoking status: Former Smoker     Types: Cigarettes     Last attempt to quit: 1/1/2003     Years since quitting: 15.7    Smokeless tobacco: Never Used   Substance and Sexual Activity    Alcohol use: Yes     Comment: occassional    Drug use: No    Sexual activity: None   Other Topics Concern    None   Social History Narrative    None       Current Outpatient Medications   Medication Sig Dispense Refill    amLODIPine (NORVASC) 2.5 MG tablet Take 1 tablet (2.5 mg total) by mouth Daily. 90 tablet 1    bisoprolol (ZEBETA) 10 MG tablet Take 1 tablet (10 mg total) by mouth once daily. 90 tablet 1    levothyroxine (SYNTHROID) 112 MCG tablet Take 1 tablet (112 mcg total) by mouth once daily. 90 tablet 1    UBIDECARENONE (COENZYME Q10) 60 mg Cap Take 1 capsule by mouth Daily.       No current facility-administered medications for this visit.        Review of patient's allergies indicates:   Allergen Reactions    Lisinopril      Other reaction(s): Cough  "    Objective:    HPI     Hyperlipidemia      Additional comments: 6 mo. f/u              Thyroid Problem      Additional comments: 6 mo. f/u          Last edited by Daija Haque LPN on 10/4/2018  9:31 AM. (History)      Blood pressure 122/78, pulse 65, height 5' 3" (1.6 m), weight 68.9 kg (151 lb 12.8 oz), SpO2 98 %. Body mass index is 26.89 kg/m².   Physical Exam   Constitutional: She is oriented to person, place, and time. She appears well-developed. She is cooperative. No distress.   overweight   HENT:   Head: Normocephalic and atraumatic.   Right Ear: Tympanic membrane normal.   Left Ear: Tympanic membrane normal.   Nose: Nose normal.   Mouth/Throat: Oropharynx is clear and moist.   Eyes: Conjunctivae, EOM and lids are normal. Pupils are equal, round, and reactive to light. Lids are everted and swept, no foreign bodies found. Right pupil is round and reactive. Left pupil is round and reactive.   Neck: Trachea normal and normal range of motion. Neck supple. No thyromegaly present.   Cardiovascular: Normal rate, regular rhythm, S1 normal, S2 normal, normal heart sounds and intact distal pulses.   No murmur heard.  Pulmonary/Chest: Effort normal and breath sounds normal. No respiratory distress. She has no wheezes.   Abdominal: Soft. Bowel sounds are normal. There is no rigidity and no guarding.   Lymphadenopathy:     She has no cervical adenopathy.     She has no axillary adenopathy.   Neurological: She is alert and oriented to person, place, and time.   Skin: Skin is warm and dry. Capillary refill takes less than 2 seconds.   Psychiatric: She has a normal mood and affect. Her behavior is normal. Judgment and thought content normal.   Nursing note and vitals reviewed.          Assessment:       1. Acquired hypothyroidism    2. Benign hypertension    3. Multiple-type hyperlipidemia    4. Overweight        Plan:       Yudith was seen today for hyperlipidemia and thyroid problem.    Diagnoses and all orders for " this visit:    Acquired hypothyroidism  -     TSH; Future  -     TSH  -     levothyroxine (SYNTHROID) 112 MCG tablet; Take 1 tablet (112 mcg total) by mouth once daily.    Benign hypertension  -     Urinalysis; Future  -     Comprehensive metabolic panel; Future  -     CBC auto differential; Future  -     Urinalysis  -     Comprehensive metabolic panel  -     CBC auto differential  -     bisoprolol (ZEBETA) 10 MG tablet; Take 1 tablet (10 mg total) by mouth once daily.  -     amLODIPine (NORVASC) 2.5 MG tablet; Take 1 tablet (2.5 mg total) by mouth Daily.  -Lifestyle changes: Reduce the amount of salt in your diet; Lose weight; Avoid drinking too much alcohol; Exercise at least 30 minutes per day most days of the week.  Continue current medications and home BP monitoring.     Multiple-type hyperlipidemia  -     Lipid panel; Future  -     Lipid panel  -Limit red meat, butter, fried foods, cheese, and other foods that have a lot of saturated fat. Consume more: lean meats, fish, fruits, vegetables, whole grains, beans, lentils, and nuts.  Weight loss, and 30-45 min of cardiovascular exercise daily.     Overweight  -The patient is asked to make an attempt to improve diet and exercise patterns to aid in medical management of this problem.      Obtain labs within the next 2 weeks.  Follow up in 6 months for hypertension, hyperlipidemia, and thyroid.

## 2018-12-06 ENCOUNTER — OFFICE VISIT (OUTPATIENT)
Dept: FAMILY MEDICINE | Facility: CLINIC | Age: 63
End: 2018-12-06
Payer: COMMERCIAL

## 2018-12-06 VITALS
HEIGHT: 63 IN | SYSTOLIC BLOOD PRESSURE: 138 MMHG | BODY MASS INDEX: 27.5 KG/M2 | OXYGEN SATURATION: 98 % | DIASTOLIC BLOOD PRESSURE: 68 MMHG | HEART RATE: 71 BPM | WEIGHT: 155.19 LBS

## 2018-12-06 DIAGNOSIS — E03.9 ACQUIRED HYPOTHYROIDISM: ICD-10-CM

## 2018-12-06 DIAGNOSIS — Z01.818 PREOPERATIVE CLEARANCE: ICD-10-CM

## 2018-12-06 DIAGNOSIS — E78.2 MULTIPLE-TYPE HYPERLIPIDEMIA: ICD-10-CM

## 2018-12-06 DIAGNOSIS — I10 BENIGN HYPERTENSION: Primary | ICD-10-CM

## 2018-12-06 PROCEDURE — 3078F DIAST BP <80 MM HG: CPT | Mod: ,,, | Performed by: NURSE PRACTITIONER

## 2018-12-06 PROCEDURE — 3075F SYST BP GE 130 - 139MM HG: CPT | Mod: ,,, | Performed by: NURSE PRACTITIONER

## 2018-12-06 PROCEDURE — 99214 OFFICE O/P EST MOD 30 MIN: CPT | Mod: ,,, | Performed by: NURSE PRACTITIONER

## 2018-12-06 PROCEDURE — 3008F BODY MASS INDEX DOCD: CPT | Mod: ,,, | Performed by: NURSE PRACTITIONER

## 2018-12-06 NOTE — PROGRESS NOTES
Subjective:       Patient ID: Yudith Haider is a 63 y.o. female.    Chief Complaint: Pre-op Exam (pt. is having left dorsal wrist biopsy)    Patient presents with:  Pre-op Exam: pt. is having left dorsal wrist biopsy       Subjective:    Yudith Haider is a 63 y.o. female who presents to the office today for a preoperative consultation at the request of surgeon Dr. Ayala who plans on performing Left dorsal wrist mass biopsy on date to be determined. This consultation is requested for the specific conditions prompting preoperative evaluation (i.e. because of potential affect on operative risk). Planned anesthesia: local and IV sedation. The patient has the following known anesthesia issues: none. Patients bleeding risk: no recent abnormal bleeding.     Procedure Risk: low  Recent Procedures: none  Recent infections: none    Heart Disease:  Angina history No  MI>6month No If yes then 5 points  MI<6 months No If yes then 10 points  Unstable Angina No If yes then 10 points  Class III No If yes then 10 points  Class IV No If yes then 20 points  Revasc <5years No and asymptomatic No  If symptomatic then needs restudy.  Pulmonary Edema history No If yes then 5 points  Pulmonary Edema <7days No If yes then 10 points  Critical Aortic Stenosis No If yes then 20 points  Abnormal EKG No  PACs No If yes 5 points        General poor health No If yes then 5 points  Age > 70 No If yes then 10 points  CVA No  HTN Yes  COPD No Consider PFTs prior to thoracotomy  Liver Failure/Alcohol withdrawal No  Anemia No    If any yes in categories greater than or equal to 10 points marked yes, procedure should be delayed, angiography and risk-factor modification should be considered. If the presence of other clinical predictors then stress test prior to procedure.   Points total 10   0-15 = low, 15-30 = intermediate, 31+ = high    Cardiographics  ECG: Not indicated  Echocardiogram: not done    Imaging  Chest x-ray: Not indicated     Lab  Review   If large blood loss possible during surgery then CBC pre-op  No visits with results within 2 Month(s) from this visit.  Latest known visit with results is:  Office Visit on 10/04/2018  Color, UA                 Value: YELLOW             Dt: 12/06/2018  Appearance, UA            Value: CLEAR              Dt: 12/06/2018  Specific Gravity, UA      Value: 1.007              Dt: 12/06/2018  pH, UA                    Value: 7.0                Dt: 12/06/2018  Glucose, UA               Value: NEGATIVE           Dt: 12/06/2018  Bilirubin, UA             Value: NEGATIVE           Dt: 12/06/2018  Ketones, UA               Value: NEGATIVE           Dt: 12/06/2018  Occult Blood UA           Value: NEGATIVE           Dt: 12/06/2018  Protein, UA               Value: NEGATIVE           Dt: 12/06/2018  Nitrite, UA               Value: NEGATIVE           Dt: 12/06/2018  Leukocytes, UA            Value: NEGATIVE           Dt: 12/06/2018  Glucose                   Value: 108(mg/dL)*        Dt: 12/06/2018  BUN, Bld                  Value: 10(mg/dL)          Dt: 12/06/2018  Creatinine                Value: 0.53(mg/dL)        Dt: 12/06/2018  eGFR if non African Amer* Value: 100(mL/min/1.73m2) Dt: 12/06/2018  eGFR if   Value: 116(mL/min/1.73m2) Dt: 12/06/2018  BUN/Creatinine Ratio                                Dt: 12/06/2018                  Value: NOT APPLICABLE   Sodium                    Value: 136(mmol/L)        Dt: 12/06/2018  Potassium                 Value: 4.1(mmol/L)        Dt: 12/06/2018  Chloride                  Value: 99(mmol/L)         Dt: 12/06/2018  CO2                       Value: 27(mmol/L)         Dt: 12/06/2018  Calcium                   Value: 9.5(mg/dL)         Dt: 12/06/2018  Total Protein             Value: 7.4(g/dL)          Dt: 12/06/2018  Albumin                   Value: 4.7(g/dL)          Dt: 12/06/2018  Globulin, Total           Value: 2.7(g/dL (calc))   Dt:  12/06/2018  Albumin/Globulin Ratio    Value: 1.7((calc))        Dt: 12/06/2018  Total Bilirubin           Value: 0.6(mg/dL)         Dt: 12/06/2018  Alkaline Phosphatase      Value: 88(U/L)            Dt: 12/06/2018  AST                       Value: 14(U/L)            Dt: 12/06/2018  ALT                       Value: 12(U/L)            Dt: 12/06/2018  WBC                       Value: 6.3(Thousand/uL)   Dt: 12/06/2018  RBC                       Value: 4.03(Million/uL)   Dt: 12/06/2018  Hemoglobin                Value: 12.8(g/dL)         Dt: 12/06/2018  Hematocrit                Value: 38.5(%)            Dt: 12/06/2018  MCV                       Value: 95.5(fL)           Dt: 12/06/2018  MCH                       Value: 31.8(pg)           Dt: 12/06/2018  MCHC                      Value: 33.2(g/dL)         Dt: 12/06/2018  RDW                       Value: 12.4(%)            Dt: 12/06/2018  Platelets                 Value: 225(Thousand/uL)   Dt: 12/06/2018  MPV                       Value: 10.4(fL)           Dt: 12/06/2018  Neutrophils Absolute      Value: 4,479(cells/uL)    Dt: 12/06/2018  Lymph #                   Value: 1,329(cells/uL)    Dt: 12/06/2018  Mono #                    Value: 410(cells/uL)      Dt: 12/06/2018  Eos #                     Value: 50(cells/uL)       Dt: 12/06/2018  Baso #                    Value: 32(cells/uL)       Dt: 12/06/2018  Neutrophils Relative      Value: 71.1(%)            Dt: 12/06/2018  Lymph%                    Value: 21.1(%)            Dt: 12/06/2018  Mono%                     Value: 6.5(%)             Dt: 12/06/2018  Eosinophil%               Value: 0.8(%)             Dt: 12/06/2018  Basophil%                 Value: 0.5(%)             Dt: 12/06/2018  TSH                       Value: 1.19(mIU/L)        Dt: 12/06/2018  Cholesterol               Value: 197(mg/dL)         Dt: 12/06/2018  HDL                       Value: 71(mg/dL)          Dt: 12/06/2018  Triglycerides             Value:  160(mg/dL)*        Dt: 12/06/2018  LDL Cholesterol           Value: 99(mg/dL (calc))   Dt: 12/06/2018  HDL/Chol Ratio            Value: 2.8((calc))        Dt: 12/06/2018  Non HDL Chol. (LDL+VLDL)  Value: 126(mg/dL (calc))  Dt: 12/06/2018  ------------     Assessment:      63 y.o. female with planned surgery as above.    Known risk factors for perioperative complications: None    Difficulty with intubation is not anticipated.    Cardiac Risk Estimation: low     Plan:    1. Preoperative workup as follows hemoglobin, hematocrit, electrolytes, creatinine, glucose, liver function studies.   2. Medications reviewed. Change in medication regimen before surgery: none, continue medication regimen including morning of surgery, with sip of water.   3. Prophylaxis for cardiac events with perioperative beta-blockers: not indicated. Caution in patients with COPD.   4. Invasive hemodynamic monitoring perioperatively: not indicated.  5. Deep vein thrombosis prophylaxis postoperatively:regimen to be chosen by surgical team.  6. Other measures: Preoperative alcohol abstention x 30 days.  7. Discuss with team  8. No Follow-up on file.      As with all procedures, there is inherent risk of multiple complications including bleeding,  infectious, cardiac, and pulmonary.  Please stop Aspirin and NSAIDS one week prior to surgery.    All smokers should quit smoking eight or more weeks prior to procedure to minimize complications associated with smoking. Reduction or cessation of smoking for less than four to eight weeks before surgery is of questionable benefit, and has actually been shown in some studies to result in higher complication rates.  Alcohol should be used in moderation.  Any pt with cardiopulmonary disease may warrant repeat examination prior to procedure along with directions for deep breathing exercises and incentive spirometry.    Patients at high risk for complications usually warrant cardiology consultation and possibly  angiography. Cardiac stress testing should be performed in patients at intermediate risk and with poor functional capacity or who are undergoing high-risk procedures, such as vascular surgery. For patients with minor clinical predictors, only patients who have poor functional capacity and are undergoing a high-risk procedure require stress testing. Patients with positive stress test results warrant cardiology consultation before proceeding with surgery.  Predisposing risk factors for pulmonary complications include cough, dyspnea, smoking, a history of lung disease, obesity and abdominal or thoracic surgery.    Any pts > 70 years old may be at risk for delirium or cardiac complications.  Furthermore, diabetic patients may be at risk for infection or prolonged healing.      Tessie Leon  12/06/2018 8:13 AM      Review of Systems   Constitutional: Negative for activity change, appetite change and fever.        Overweight   HENT: Negative for congestion, ear discharge, ear pain, sore throat, trouble swallowing and voice change.    Eyes: Negative for photophobia, pain, discharge and visual disturbance.   Respiratory: Negative for cough, chest tightness and shortness of breath.    Cardiovascular: Negative for chest pain and palpitations.        Hypertension   Gastrointestinal: Negative for abdominal pain, nausea and vomiting.   Endocrine: Negative for cold intolerance and heat intolerance.        Hypothyroid   Genitourinary: Negative for difficulty urinating and dysuria.   Musculoskeletal: Negative for arthralgias and gait problem.   Skin: Negative for rash.   Allergic/Immunologic: Negative for immunocompromised state.   Neurological: Negative for speech difficulty and headaches.   Psychiatric/Behavioral: Negative for confusion, self-injury and suicidal ideas.       Past Medical History:   Diagnosis Date    Hyperlipidemia     Hypertension     Hypothyroidism       Past Surgical History:   Procedure Laterality Date     HYSTERECTOMY  1981    INJECTION-STEROID-EPIDURAL-CERVICAL N/A 8/22/2017    [facility]    INJECTION-STEROID-EPIDURAL-CERVICAL N/A 7/14/2017    [facility]    THYROIDECTOMY  1993       Family History   Problem Relation Age of Onset    Hypertension Mother     Diabetes Mother     Stroke Father     Hypertension Father        Social History     Socioeconomic History    Marital status:      Spouse name: None    Number of children: None    Years of education: None    Highest education level: None   Social Needs    Financial resource strain: None    Food insecurity - worry: None    Food insecurity - inability: None    Transportation needs - medical: None    Transportation needs - non-medical: None   Occupational History    None   Tobacco Use    Smoking status: Former Smoker     Types: Cigarettes     Last attempt to quit: 1/1/2003     Years since quitting: 15.9    Smokeless tobacco: Never Used   Substance and Sexual Activity    Alcohol use: Yes     Comment: occassional    Drug use: No    Sexual activity: None   Other Topics Concern    None   Social History Narrative    None       Current Outpatient Medications   Medication Sig Dispense Refill    amLODIPine (NORVASC) 2.5 MG tablet Take 1 tablet (2.5 mg total) by mouth Daily. 90 tablet 1    bisoprolol (ZEBETA) 10 MG tablet Take 1 tablet (10 mg total) by mouth once daily. 90 tablet 1    levothyroxine (SYNTHROID) 112 MCG tablet Take 1 tablet (112 mcg total) by mouth once daily. 90 tablet 1    UBIDECARENONE (COENZYME Q10) 60 mg Cap Take 1 capsule by mouth Daily.       No current facility-administered medications for this visit.        Review of patient's allergies indicates:   Allergen Reactions    Lisinopril      Other reaction(s): Cough     Objective:    HPI     Pre-op Exam      Additional comments: pt. is having left dorsal wrist biopsy          Last edited by Daija Haque LPN on  "12/6/2018  8:00 AM. (History)      Blood pressure 138/68, pulse 71, height 5' 3" (1.6 m), weight 70.4 kg (155 lb 3.2 oz), SpO2 98 %. Body mass index is 27.49 kg/m².   Physical Exam   Constitutional: She is oriented to person, place, and time. She appears well-developed. She is cooperative. No distress.   overweight   HENT:   Head: Normocephalic and atraumatic.   Right Ear: Tympanic membrane and external ear normal.   Left Ear: Tympanic membrane and external ear normal.   Nose: Nose normal.   Mouth/Throat: Oropharynx is clear and moist.   Eyes: Conjunctivae, EOM and lids are normal. Pupils are equal, round, and reactive to light. Lids are everted and swept, no foreign bodies found. Right pupil is round and reactive. Left pupil is round and reactive.   Neck: Trachea normal and normal range of motion. Neck supple.   Cardiovascular: Normal rate, regular rhythm, S1 normal, S2 normal, normal heart sounds and intact distal pulses.   Pulmonary/Chest: Effort normal and breath sounds normal. She has no wheezes.   Abdominal: Soft. Bowel sounds are normal. There is no rigidity and no guarding.   Musculoskeletal: Normal range of motion.        Left wrist: She exhibits tenderness and swelling.        Arms:  Lymphadenopathy:     She has no cervical adenopathy.     She has no axillary adenopathy.   Neurological: She is alert and oriented to person, place, and time.   Skin: Skin is warm and dry. Capillary refill takes less than 2 seconds.   Psychiatric: She has a normal mood and affect. Her behavior is normal. Judgment and thought content normal.   Nursing note and vitals reviewed.          Assessment:       1. Benign hypertension    2. Acquired hypothyroidism    3. Multiple-type hyperlipidemia    4. Preoperative clearance        Plan:       Yudith was seen today for pre-op exam.    Diagnoses and all orders for this visit:    Benign hypertension  -Lifestyle changes: Reduce the amount of salt in your diet; Lose weight; Avoid drinking " too much alcohol; Exercise at least 30 minutes per day most days of the week.  Continue current medications and home BP monitoring.     Acquired hypothyroidism  -Stable.  Continue current medications.    Multiple-type hyperlipidemia  -Limit red meat, butter, fried foods, cheese, and other foods that have a lot of saturated fat. Consume more: lean meats, fish, fruits, vegetables, whole grains, beans, lentils, and nuts.  Weight loss, and 30-45 min of cardiovascular exercise daily.     Preoperative clearance  -All chronic conditions and labs are stable and within normal limits.  Patient is cleared for surgery.

## 2018-12-07 LAB
ALBUMIN SERPL-MCNC: 4.7 G/DL (ref 3.6–5.1)
ALBUMIN/GLOB SERPL: 1.7 (CALC) (ref 1–2.5)
ALP SERPL-CCNC: 88 U/L (ref 33–130)
ALT SERPL-CCNC: 12 U/L (ref 6–29)
APPEARANCE UR: CLEAR
AST SERPL-CCNC: 14 U/L (ref 10–35)
BASOPHILS # BLD AUTO: 32 CELLS/UL (ref 0–200)
BASOPHILS NFR BLD AUTO: 0.5 %
BILIRUB SERPL-MCNC: 0.6 MG/DL (ref 0.2–1.2)
BILIRUB UR QL STRIP: NEGATIVE
BUN SERPL-MCNC: 10 MG/DL (ref 7–25)
BUN/CREAT SERPL: ABNORMAL (CALC) (ref 6–22)
CALCIUM SERPL-MCNC: 9.5 MG/DL (ref 8.6–10.4)
CHLORIDE SERPL-SCNC: 99 MMOL/L (ref 98–110)
CHOLEST SERPL-MCNC: 197 MG/DL
CHOLEST/HDLC SERPL: 2.8 (CALC)
CO2 SERPL-SCNC: 27 MMOL/L (ref 20–32)
COLOR UR: YELLOW
CREAT SERPL-MCNC: 0.53 MG/DL (ref 0.5–0.99)
EOSINOPHIL # BLD AUTO: 50 CELLS/UL (ref 15–500)
EOSINOPHIL NFR BLD AUTO: 0.8 %
ERYTHROCYTE [DISTWIDTH] IN BLOOD BY AUTOMATED COUNT: 12.4 % (ref 11–15)
GFR SERPL CREATININE-BSD FRML MDRD: 100 ML/MIN/1.73M2
GLOBULIN SER CALC-MCNC: 2.7 G/DL (CALC) (ref 1.9–3.7)
GLUCOSE SERPL-MCNC: 108 MG/DL (ref 65–99)
GLUCOSE UR QL STRIP: NEGATIVE
HCT VFR BLD AUTO: 38.5 % (ref 35–45)
HDLC SERPL-MCNC: 71 MG/DL
HGB BLD-MCNC: 12.8 G/DL (ref 11.7–15.5)
HGB UR QL STRIP: NEGATIVE
KETONES UR QL STRIP: NEGATIVE
LDLC SERPL CALC-MCNC: 99 MG/DL (CALC)
LEUKOCYTE ESTERASE UR QL STRIP: NEGATIVE
LYMPHOCYTES # BLD AUTO: 1329 CELLS/UL (ref 850–3900)
LYMPHOCYTES NFR BLD AUTO: 21.1 %
MCH RBC QN AUTO: 31.8 PG (ref 27–33)
MCHC RBC AUTO-ENTMCNC: 33.2 G/DL (ref 32–36)
MCV RBC AUTO: 95.5 FL (ref 80–100)
MONOCYTES # BLD AUTO: 410 CELLS/UL (ref 200–950)
MONOCYTES NFR BLD AUTO: 6.5 %
NEUTROPHILS # BLD AUTO: 4479 CELLS/UL (ref 1500–7800)
NEUTROPHILS NFR BLD AUTO: 71.1 %
NITRITE UR QL STRIP: NEGATIVE
NONHDLC SERPL-MCNC: 126 MG/DL (CALC)
PH UR STRIP: 7 [PH] (ref 5–8)
PLATELET # BLD AUTO: 225 THOUSAND/UL (ref 140–400)
PMV BLD REES-ECKER: 10.4 FL (ref 7.5–12.5)
POTASSIUM SERPL-SCNC: 4.1 MMOL/L (ref 3.5–5.3)
PROT SERPL-MCNC: 7.4 G/DL (ref 6.1–8.1)
PROT UR QL STRIP: NEGATIVE
RBC # BLD AUTO: 4.03 MILLION/UL (ref 3.8–5.1)
SODIUM SERPL-SCNC: 136 MMOL/L (ref 135–146)
SP GR UR STRIP: 1.01 (ref 1–1.03)
TRIGL SERPL-MCNC: 160 MG/DL
TSH SERPL-ACNC: 1.19 MIU/L (ref 0.4–4.5)
WBC # BLD AUTO: 6.3 THOUSAND/UL (ref 3.8–10.8)

## 2018-12-10 NOTE — PATIENT INSTRUCTIONS
Presurgery Checklist  You are scheduled to have surgery. The healthcare staff will try to make your stay comfortable. Use the guidelines below to remind yourself what to do before surgery. Be sure to follow any specific pre-op instructions from your surgeon or nurse.  Preparing for surgery  · If you are having abdominal surgery, ask what you need to do to clear your bowel.  · Tell your surgeon if you have allergies to any medicines, latex, or foods.  · Ask your surgeon if you might need a blood transfusion during surgery and if so, how to prepare for it. In some cases, you can donate blood before surgery. If needed, this blood can be given back (transfused) to you during or after surgery.  · Arrange for an adult family member or friend to drive you home after surgery. If possible, have someone ready to help you at home as you recover.  · Call the surgeon if you get a cold, fever, sore throat, diarrhea, or other health problem just before surgery. Your surgeon can decide whether or not to postpone the surgery.  Medicines  · Tell your surgeon about all medicines you take, including prescription and over-the-counter products such as herbal remedies and vitamins. Ask if you should continue taking them.  · If you take ibuprofen, naproxen, or blood thinners (anticoagulants) such as aspirin, clopidogrel, or warfarin, ask your surgeon whether you should stop taking them and how long before surgery you should stop.  · You may be told to take antibiotics just before surgery to prevent infection. If so, follow instructions carefully on how to take them.  · If you are told to take blood thinners to help prevent blood clots after surgery, be sure to follow the instructions on how to take them.  Stop smoking  If you smoke, healing may take longer. So at least 2 week(s) before surgery, stop smoking.  Bathing or showering before surgery  · If instructed, wash with antibacterial soap. Afterward, do not use lotions, oils, or  powders.  · If you are having surgery on the head, you may be asked to shampoo with antibacterial soap. Follow instructions for doing so.  Do not remove hair from the surgery site  Do not shave hair from the incision site, unless you are given specific instructions to do so. Usually, if hair needs to be removed, it will be done at the hospital right before surgery.  Dont eat or drink  · Follow any directions you are given for not eating or drinking before surgery. If you don't follow instructions about when to stop eating and drinking, your procedure may be postponed or rescheduled for another day. This is a safety issue.  · You can brush your teeth and rinse your mouth, but dont swallow any water.  Day of surgery  · Don't wear makeup. Don't use perfume, deodorant, or hairspray. Remove nail polish and artificial nails.  · Leave jewelry (including rings), watches, and other valuables at home.  · Be sure to bring health insurance cards or forms and a photo ID.  · Bring a list of your medicines (include the name, dose, how often you take them, and the time last dose was taken).  · Arrive on time at the hospital or surgery facility.  Date Last Reviewed: 12/1/2016  © 3997-6779 [employer]. [address] [place] [address], [place] [address]. All rights reserved. This information is not intended as a substitute for professional medical care. Always follow your healthcare professional's instructions.

## 2018-12-26 PROBLEM — D21.10 BENIGN NEOPLASM OF SOFT TISSUE OF UPPER EXTREMITY: Status: ACTIVE | Noted: 2018-12-26

## 2019-02-19 ENCOUNTER — OFFICE VISIT (OUTPATIENT)
Dept: FAMILY MEDICINE | Facility: CLINIC | Age: 64
End: 2019-02-19
Payer: COMMERCIAL

## 2019-02-19 VITALS
BODY MASS INDEX: 28.08 KG/M2 | HEART RATE: 62 BPM | TEMPERATURE: 99 F | HEIGHT: 63 IN | DIASTOLIC BLOOD PRESSURE: 84 MMHG | SYSTOLIC BLOOD PRESSURE: 132 MMHG | OXYGEN SATURATION: 99 % | WEIGHT: 158.5 LBS

## 2019-02-19 DIAGNOSIS — R09.81 NASAL CONGESTION: Primary | ICD-10-CM

## 2019-02-19 DIAGNOSIS — J04.0 LARYNGITIS: ICD-10-CM

## 2019-02-19 DIAGNOSIS — R05.9 COUGH: ICD-10-CM

## 2019-02-19 PROCEDURE — 3075F SYST BP GE 130 - 139MM HG: CPT | Mod: ,,, | Performed by: NURSE PRACTITIONER

## 2019-02-19 PROCEDURE — 3075F PR MOST RECENT SYSTOLIC BLOOD PRESS GE 130-139MM HG: ICD-10-PCS | Mod: ,,, | Performed by: NURSE PRACTITIONER

## 2019-02-19 PROCEDURE — 3008F BODY MASS INDEX DOCD: CPT | Mod: ,,, | Performed by: NURSE PRACTITIONER

## 2019-02-19 PROCEDURE — 3079F PR MOST RECENT DIASTOLIC BLOOD PRESSURE 80-89 MM HG: ICD-10-PCS | Mod: ,,, | Performed by: NURSE PRACTITIONER

## 2019-02-19 PROCEDURE — 99213 PR OFFICE/OUTPT VISIT, EST, LEVL III, 20-29 MIN: ICD-10-PCS | Mod: ,,, | Performed by: NURSE PRACTITIONER

## 2019-02-19 PROCEDURE — 3008F PR BODY MASS INDEX (BMI) DOCUMENTED: ICD-10-PCS | Mod: ,,, | Performed by: NURSE PRACTITIONER

## 2019-02-19 PROCEDURE — 99213 OFFICE O/P EST LOW 20 MIN: CPT | Mod: ,,, | Performed by: NURSE PRACTITIONER

## 2019-02-19 PROCEDURE — 3079F DIAST BP 80-89 MM HG: CPT | Mod: ,,, | Performed by: NURSE PRACTITIONER

## 2019-02-19 RX ORDER — BENZONATATE 100 MG/1
200 CAPSULE ORAL 3 TIMES DAILY PRN
Qty: 60 CAPSULE | Refills: 0 | Status: SHIPPED | OUTPATIENT
Start: 2019-02-19 | End: 2019-03-01

## 2019-02-19 RX ORDER — PROMETHAZINE HYDROCHLORIDE AND CODEINE PHOSPHATE 6.25; 1 MG/5ML; MG/5ML
5 SOLUTION ORAL EVERY 4 HOURS PRN
Qty: 118 ML | Refills: 0 | Status: SHIPPED | OUTPATIENT
Start: 2019-02-19 | End: 2019-03-01

## 2019-02-19 RX ORDER — FLUTICASONE PROPIONATE 50 MCG
1 SPRAY, SUSPENSION (ML) NASAL 2 TIMES DAILY
Qty: 16 G | Refills: 1 | Status: SHIPPED | OUTPATIENT
Start: 2019-02-19 | End: 2019-03-18

## 2019-02-19 NOTE — PATIENT INSTRUCTIONS
Laryngitis    Laryngitis is a swelling of the tissues around the vocal cords. Symptoms include a hoarse (scratchy) voice. The voice may be lost completely. It may be caused by a viral illness, such as a head or chest cold. It may also be due to overuse and strain of the voice. Smoking, drinking alcohol, acid reflux, allergies, or inhaling harsh chemicals may also lead to symptoms. This condition will usually resolve in 1-2 weeks.  Home care  · Rest your voice until it recovers. Talk as little as possible. If your symptoms are severe, rest at home for a day or so.  · Breathing cool steam from a humidifier/vaporizer or in a steamy shower may be helpful.  · Drink plenty of fluids to stay well hydrated.  · Do not smoke  Follow-up care  Follow up with your healthcare provider or this facility if you have not improved after one week.  When to seek medical advice  Contact your healthcare provider for any of the following:  · Severe pain with swallowing  · Trouble opening mouth  · Neck swelling, neck pain, or trouble moving neck  · Noisy breathing or trouble breathing  · Fever of 100.4°F (38.ºC) or higher, or as directed by your healthcare provider  · Drooling  · Symptoms do not resolve in 2 weeks  Date Last Reviewed: 4/26/2015 © 2000-2017 [employer]. [address] [place] [address], [place] [address]. All rights reserved. This information is not intended as a substitute for professional medical care. Always follow your healthcare professional's instructions.        Laryngitis    Laryngitis is a swelling of the tissues around the vocal cords. Symptoms include a hoarse (scratchy) voice. The voice may be lost completely. It may be caused by a viral illness, such as a head or chest cold. It may also be due to overuse and strain of the voice. Smoking, drinking alcohol, acid reflux, allergies, or inhaling harsh chemicals may also lead to symptoms. This condition will usually resolve in 1-2 weeks.  Home care  · Rest your  voice until it recovers. Talk as little as possible. If your symptoms are severe, rest at home for a day or so.  · Breathing cool steam from a humidifier/vaporizer or in a steamy shower may be helpful.  · Drink plenty of fluids to stay well hydrated.  · Do not smoke  Follow-up care  Follow up with your healthcare provider or this facility if you have not improved after one week.  When to seek medical advice  Contact your healthcare provider for any of the following:  · Severe pain with swallowing  · Trouble opening mouth  · Neck swelling, neck pain, or trouble moving neck  · Noisy breathing or trouble breathing  · Fever of 100.4°F (38.ºC) or higher, or as directed by your healthcare provider  · Drooling  · Symptoms do not resolve in 2 weeks  Date Last Reviewed: 4/26/2015  © 2642-1153 [employer]. [address] [place] [address]. All rights reserved. This information is not intended as a substitute for professional medical care. Always follow your healthcare professional's instructions.        Viral Upper Respiratory Illness (Adult)  You have a viral upper respiratory illness (URI), which is another term for the common cold. This illness is contagious during the first few days. It is spread through the air by coughing and sneezing. It may also be spread by direct contact (touching the sick person and then touching your own eyes, nose, or mouth). Frequent handwashing will decrease risk of spread. Most viral illnesses go away within 7 to 10 days with rest and simple home remedies. Sometimes the illness may last for several weeks. Antibiotics will not kill a virus, and they are generally not prescribed for this condition.    Home care  · If symptoms are severe, rest at home for the first 2 to 3 days. When you resume activity, don't let yourself get too tired.  · Avoid being exposed to cigarette smoke (yours or others).  · You may use acetaminophen or ibuprofen to control pain and fever, unless  another medicine was prescribed. (Note: If you have chronic liver or kidney disease, have ever had a stomach ulcer or gastrointestinal bleeding, or are taking blood-thinning medicines, talk with your healthcare provider before using these medicines.) Aspirin should never be given to anyone under 18 years of age who is ill with a viral infection or fever. It may cause severe liver or brain damage.  · Your appetite may be poor, so a light diet is fine. Avoid dehydration by drinking 6 to 8 glasses of fluids per day (water, soft drinks, juices, tea, or soup). Extra fluids will help loosen secretions in the nose and lungs.  · Over-the-counter cold medicines will not shorten the length of time youre sick, but they may be helpful for the following symptoms: cough, sore throat, and nasal and sinus congestion. (Note: Do not use decongestants if you have high blood pressure.)  Follow-up care  Follow up with your healthcare provider, or as advised.  When to seek medical advice  Call your healthcare provider right away if any of these occur:  · Cough with lots of colored sputum (mucus)  · Severe headache; face, neck, or ear pain  · Difficulty swallowing due to throat pain  · Fever of 100.4°F (38°C)  Call 911, or get immediate medical care  Call emergency services right away if any of these occur:  · Chest pain, shortness of breath, wheezing, or difficulty breathing  · Coughing up blood  · Inability to swallow due to throat pain  Date Last Reviewed: 9/13/2015 © 2000-2017 [employer]. [address] [place] [address], [place] [address]. All rights reserved. This information is not intended as a substitute for professional medical care. Always follow your healthcare professional's instructions.

## 2019-02-19 NOTE — PROGRESS NOTES
Subjective:       Patient ID: Yudith Haider is a 63 y.o. female.    Chief Complaint: Cough and Sore Throat    Sinus Problem   This is a new problem. The current episode started in the past 7 days (2 days). The problem has been gradually worsening since onset. There has been no fever (99 low grade). Her pain is at a severity of 3/10. The pain is mild. Associated symptoms include congestion, coughing, a hoarse voice and a sore throat (mild). Pertinent negatives include no chills, diaphoresis, ear pain, headaches, neck pain, shortness of breath, sinus pressure, sneezing or swollen glands. Past treatments include nothing. The treatment provided no relief.   Cough   This is a new problem. The current episode started in the past 7 days (2 days). The problem has been waxing and waning. The problem occurs every few minutes. The cough is non-productive. Associated symptoms include ear congestion, postnasal drip and a sore throat (mild). Pertinent negatives include no chest pain, chills, ear pain, fever, headaches, rash, shortness of breath or wheezing. The symptoms are aggravated by lying down. She has tried OTC cough suppressant for the symptoms. The treatment provided mild relief. There is no history of environmental allergies.     Review of Systems   Constitutional: Negative for activity change, appetite change, chills, diaphoresis and fever.        Overweight   HENT: Positive for congestion, hoarse voice, postnasal drip and sore throat (mild). Negative for ear discharge, ear pain, sinus pressure, sneezing, trouble swallowing and voice change.    Eyes: Negative for photophobia, pain, discharge and visual disturbance.   Respiratory: Positive for cough. Negative for chest tightness, shortness of breath and wheezing.    Cardiovascular: Negative for chest pain and palpitations.   Gastrointestinal: Negative for abdominal pain, nausea and vomiting.   Endocrine: Negative for cold intolerance and heat intolerance.    Genitourinary: Negative for difficulty urinating and dysuria.   Musculoskeletal: Negative for arthralgias, gait problem and neck pain.   Skin: Negative for rash.   Allergic/Immunologic: Negative for environmental allergies and immunocompromised state.   Neurological: Negative for speech difficulty and headaches.   Psychiatric/Behavioral: Negative for confusion, self-injury and suicidal ideas.       Past Medical History:   Diagnosis Date    History of pneumonia     Hyperlipidemia     Hypertension     Hypothyroidism       Past Surgical History:   Procedure Laterality Date    BIOPSY, MASS, UPPER EXTREMITY/ dorsal wrist Left 2018    [facility]    CARPAL TUNNEL RELEASE Bilateral     HAND SURGERY Right 2018    removal of for. object    HYSTERECTOMY      INJECTION-STEROID-EPIDURAL-CERVICAL N/A 2017    [facility]    INJECTION-STEROID-EPIDURAL-CERVICAL N/A 2017    [facility]    THYROIDECTOMY         Family History   Problem Relation Age of Onset    Hypertension Mother     Diabetes Mother     Stroke Father     Hypertension Father        Social History     Socioeconomic History    Marital status:      Spouse name: None    Number of children: None    Years of education: None    Highest education level: None   Social Needs    Financial resource strain: None    Food insecurity - worry: None    Food insecurity - inability: None    Transportation needs - medical: None    Transportation needs - non-medical: None   Occupational History    None   Tobacco Use    Smoking status: Former Smoker     Types: Cigarettes     Last attempt to quit: 2003     Years since quittin.1    Smokeless tobacco: Never Used   Substance and Sexual Activity    Alcohol use: Yes     Comment: occassional    Drug use: No    Sexual activity: None   Other Topics Concern    None   Social History  "Narrative    None       Current Outpatient Medications   Medication Sig Dispense Refill    acetaminophen (TYLENOL ARTHRITIS ORAL) Take 3 tablets by mouth.      amLODIPine (NORVASC) 2.5 MG tablet Take 1 tablet (2.5 mg total) by mouth Daily. 90 tablet 1    bisoprolol (ZEBETA) 10 MG tablet Take 1 tablet (10 mg total) by mouth once daily. 90 tablet 1    levothyroxine (SYNTHROID) 112 MCG tablet Take 1 tablet (112 mcg total) by mouth once daily. 90 tablet 1    UBIDECARENONE (COENZYME Q10) 60 mg Cap Take 1 capsule by mouth Daily.      benzonatate (TESSALON) 100 MG capsule Take 2 capsules (200 mg total) by mouth 3 (three) times daily as needed. 60 capsule 0    fluticasone (FLONASE) 50 mcg/actuation nasal spray 1 spray (50 mcg total) by Each Nare route 2 (two) times daily. 16 g 1    promethazine-codeine 6.25-10 mg/5 ml (PHENERGAN WITH CODEINE) 6.25-10 mg/5 mL syrup Take 5 mLs by mouth every 4 (four) hours as needed. 118 mL 0     No current facility-administered medications for this visit.      Facility-Administered Medications Ordered in Other Visits   Medication Dose Route Frequency Provider Last Rate Last Dose    lactated ringers infusion   Intravenous Continuous Blake Quach MD   Stopped at 12/26/18 0915       Review of patient's allergies indicates:   Allergen Reactions    Lisinopril      Other reaction(s): Cough     Objective:      Blood pressure 132/84, pulse 62, temperature 99.1 °F (37.3 °C), temperature source Oral, height 5' 3" (1.6 m), weight 71.9 kg (158 lb 8 oz), SpO2 99 %. Body mass index is 28.08 kg/m².   Physical Exam   Constitutional: She is oriented to person, place, and time. She appears well-developed and well-nourished. She is cooperative. No distress.   HENT:   Head: Normocephalic and atraumatic.   Right Ear: Ear canal normal. A middle ear effusion is present.   Left Ear: Ear canal normal. A middle ear effusion is present.   Nose: Mucosal edema present. Right sinus exhibits no maxillary " sinus tenderness and no frontal sinus tenderness. Left sinus exhibits no maxillary sinus tenderness and no frontal sinus tenderness.   Mouth/Throat: Uvula is midline. Oropharyngeal exudate (clear post nasal drainage) present.   Eyes: Conjunctivae, EOM and lids are normal. Pupils are equal, round, and reactive to light. Lids are everted and swept, no foreign bodies found. Right pupil is round and reactive. Left pupil is round and reactive.   Neck: Trachea normal and normal range of motion. Neck supple.   Cardiovascular: Normal rate, regular rhythm, S1 normal, S2 normal, normal heart sounds and intact distal pulses.   Pulmonary/Chest: Effort normal and breath sounds normal. She has no wheezes.   Abdominal: Soft. Bowel sounds are normal. There is no rigidity and no guarding.   Musculoskeletal: Normal range of motion.   Lymphadenopathy:     She has no cervical adenopathy.     She has no axillary adenopathy.   Neurological: She is alert and oriented to person, place, and time.   Skin: Skin is warm and dry. Capillary refill takes less than 2 seconds.   Psychiatric: She has a normal mood and affect. Her behavior is normal. Judgment and thought content normal.   Nursing note and vitals reviewed.          Assessment:       1. Nasal congestion    2. Laryngitis    3. Cough        Plan:       Yudith was seen today for cough and sore throat.    Diagnoses and all orders for this visit:    Nasal congestion  -     fluticasone (FLONASE) 50 mcg/actuation nasal spray; 1 spray (50 mcg total) by Each Nare route 2 (two) times daily.    Laryngitis  -Hot tea with lemon and honey encouraged for supporative relief.     Cough  -     promethazine-codeine 6.25-10 mg/5 ml (PHENERGAN WITH CODEINE) 6.25-10 mg/5 mL syrup; Take 5 mLs by mouth every 4 (four) hours as needed.  -     benzonatate (TESSALON) 100 MG capsule; Take 2 capsules (200 mg total) by mouth 3 (three) times daily as needed.       Follow up if no improvement in 1 week.

## 2019-03-18 ENCOUNTER — OFFICE VISIT (OUTPATIENT)
Dept: RHEUMATOLOGY | Facility: CLINIC | Age: 64
End: 2019-03-18
Payer: COMMERCIAL

## 2019-03-18 VITALS
SYSTOLIC BLOOD PRESSURE: 118 MMHG | DIASTOLIC BLOOD PRESSURE: 77 MMHG | WEIGHT: 159.13 LBS | BODY MASS INDEX: 28.18 KG/M2

## 2019-03-18 DIAGNOSIS — M19.90 CHRONIC INFLAMMATORY ARTHRITIS: Primary | ICD-10-CM

## 2019-03-18 PROCEDURE — 3008F PR BODY MASS INDEX (BMI) DOCUMENTED: ICD-10-PCS | Mod: ,,, | Performed by: INTERNAL MEDICINE

## 2019-03-18 PROCEDURE — 3008F BODY MASS INDEX DOCD: CPT | Mod: ,,, | Performed by: INTERNAL MEDICINE

## 2019-03-18 PROCEDURE — 3078F PR MOST RECENT DIASTOLIC BLOOD PRESSURE < 80 MM HG: ICD-10-PCS | Mod: ,,, | Performed by: INTERNAL MEDICINE

## 2019-03-18 PROCEDURE — 3074F SYST BP LT 130 MM HG: CPT | Mod: ,,, | Performed by: INTERNAL MEDICINE

## 2019-03-18 PROCEDURE — 3074F PR MOST RECENT SYSTOLIC BLOOD PRESSURE < 130 MM HG: ICD-10-PCS | Mod: ,,, | Performed by: INTERNAL MEDICINE

## 2019-03-18 PROCEDURE — 99205 PR OFFICE/OUTPT VISIT, NEW, LEVL V, 60-74 MIN: ICD-10-PCS | Mod: ,,, | Performed by: INTERNAL MEDICINE

## 2019-03-18 PROCEDURE — 3078F DIAST BP <80 MM HG: CPT | Mod: ,,, | Performed by: INTERNAL MEDICINE

## 2019-03-18 PROCEDURE — 99205 OFFICE O/P NEW HI 60 MIN: CPT | Mod: ,,, | Performed by: INTERNAL MEDICINE

## 2019-03-18 RX ORDER — PREDNISONE 5 MG/1
TABLET ORAL
Qty: 42 TABLET | Refills: 0 | Status: SHIPPED | OUTPATIENT
Start: 2019-03-18 | End: 2019-04-04

## 2019-03-18 NOTE — PATIENT INSTRUCTIONS
Pulmonary Sarcoidosis  Sarcoidosis is a disease that causes inflammation of the body tissues. This leads to small lumps called granulomas. The disease can affect any organ in the body. But it often starts in the lungs and lymph nodes.  What causes sarcoidosis?  It is not known what causes sarcoidosis. It results from a problem with the bodys immune system. The main job of the immune system is to help the body fight infection. People of any age, race, or gender can have it. But it happens most often in people between the ages of 20 and 40. It is also more common in women than in men and in people exposed to hever or moldy conditions.  How it affects the lungs  When you breathe in, you inhale air that is full of oxygen. The oxygen travels from the lungs to the blood. Then it is carried to the rest of the body. In some cases of pulmonary sarcoidosis, the lungs become scarred. This is called pulmonary fibrosis. This scarring can make it hard to take a full breath. The damage can also make it hard for oxygen to pass from the lungs into the blood.  Symptoms of pulmonary sarcoidosis  Most people have no symptoms at all. If symptoms do occur, they can include dry cough, tightness in the chest, and tiredness. Wheezing, shortness of breath, skin rashes, joint pain, kidney stones, vision problems, and loss of appetite can also occur. In some cases, pulmonary sarcoidosis stops getting worse. It may even go away. In other cases, the disease is long-lasting (chronic).  Treatment of pulmonary sarcoidosis  Many people dont need treatment. The disease may not cause symptoms. And it may go away on its own. But treatment can be done to help relieve symptoms. It can reduce inflammation and help prevent damage. Medicines are used to treat the disease. More than one may be used. They may be taken in pill form or by injection. Some common ones are listed below.  · Prednisone. This is an anti-inflammatory steroid (corticosteroid). It  helps prevent or reduce inflammation that can harm lungs.  · Methotrexate. This medicine is commonly the first one used so that you can cut back on the dose of prednisone. This helps lower the long-term side effects of the steroid.  · Azathioprine. This medicine suppresses the immune system. It may be used alone or with prednisone. It helps reduce lung inflammation.  · Cyclophosphamide. This is another type of medicine that suppresses the immune system. It may be used with prednisone. You may be given it as a single dose if you have problems with prednisone.  Other medicines that may be used include anti-TNF medicines such as inflizimab and etanercept. Antimalaria medicines such as hydroxychloroquine may help when skin, joints or brain are affected.  Note: These medicines can have serious side effects. Talk with your healthcare provider about them. Don't stop taking a medicine unless your provider says its OK. And tell your healthcare provider or pharmacist about all medicines you use. This includes over-the-counter medicines. It also includes herbs or supplements.  Date Last Reviewed: 9/1/2016 © 2000-2017 [employer]. [address] [place] [address], [place] [address]. All rights reserved. This information is not intended as a substitute for professional medical care. Always follow your healthcare professional's instructions.

## 2019-03-18 NOTE — PROGRESS NOTES
[facility] RHEUMATOLOGY           New patient visit    Notes dictated via Dragon to EPIC. Please forgive any unintentional errors.  Subjective:       Patient ID:   NAME: Yudith Haider : 1955     63 y.o. female    Referring Doc: No ref. provider found  Other Physicians:    Chief Complaint:  Arthritis (Left hand and right thumb pain)        HPI:  This very pleasant lady is self referred for the evaluation of pain and swelling of the left wrist over at least 6 months. The patient states that she began to notice swelling and tenderness of the left wrist sometime in 2018. She denies any immediate repetitive motion injury. She does state that she had pain on the right hand approximately a year before that and that that particular problem was addressed surgically. I'm not certain whether there was a repair of the first CMC joint. In any case, symptoms on that side abated. She has seen several physicians for this pain and swelling in the left hand and underwent a biopsy of the wrist which was consistent with synovial proliferation. Her primary provider ordered rheumatologic blood testing and referred her here. She has not had any particular treatment for this including no injected or oral steroids. She does not tolerate NSAIDs. She does take acetaminophen and get some benefit from higher doses.    She does not have any redness, warmth, and/or swelling of other joints. She does not have any morning stiffness. Her energy level is excellent. She has had no unusual skin rashes, no unexplained I redness, no oral ulcerations, no problems with swallowing or reflux, no chest pain or shortness of breath, no cough or hemoptysis, no gastrointestinal bleeding, no significant anemia, and no history of genitourinary disease including GC.    Her past medical history is consistent with hypertension, hyperlipidemia,and acquired hypothyroidism.       ROS:   GEN:      no fever, night sweats or weight loss  SKIN:      no  rashes , erythema, bruising, or swelling, no Raynauds, no photosensitivity  HEENT:  no acute changes in vision, no mouth ulcers, no sicca symptoms, no scalp tenderness, jaw claudication.  CV:        no CP, PND, GRIER or orthopnea, no palpitations  PULM:   no SOB, cough, hemoptysis, sputum or pleuritic pain  GI:          No dysphagia or GERD, no abdominal pain, nausea, vomiting, constipation, diarrhea, melanotic stools, BRBPR, or hematemesis.  :        no hematuria, dysuria  NEURO: no paresthesias, headaches, visual disturbances, muscle weakness  MUSCULOSKELETAL:  Swelling and pain confined to the left wrist as above  PSYCH: No insomnia, no significant depression, no anxiety    Medications:    Current Outpatient Medications:     acetaminophen (TYLENOL ARTHRITIS ORAL), Take 3 tablets by mouth., Disp: , Rfl:     amLODIPine (NORVASC) 2.5 MG tablet, Take 1 tablet (2.5 mg total) by mouth Daily., Disp: 90 tablet, Rfl: 1    bisoprolol (ZEBETA) 10 MG tablet, Take 1 tablet (10 mg total) by mouth once daily., Disp: 90 tablet, Rfl: 1    levothyroxine (SYNTHROID) 112 MCG tablet, Take 1 tablet (112 mcg total) by mouth once daily., Disp: 90 tablet, Rfl: 1    UBIDECARENONE (COENZYME Q10) 60 mg Cap, Take 1 capsule by mouth Daily., Disp: , Rfl:     predniSONE (DELTASONE) 5 MG tablet, Three daily x week one, then two daily x 1 week two, then one daily x week three., Disp: 42 tablet, Rfl: 0  No current facility-administered medications for this visit.     Facility-Administered Medications Ordered in Other Visits:     lactated ringers infusion, , Intravenous, Continuous, Blake Quach MD, Stopped at 12/26/18 0915    FAMILY HISTORY: negative for Connective Tissue Disease        Review of patient's allergies indicates:   Allergen Reactions    Lisinopril      Other reaction(s): Cough             Objective:     Vitals:  Blood pressure 118/77, weight 72.2 kg (159 lb 1.6 oz).    Physical Examination:   GEN:     wn/wd female in  no apparent distress  SKIN:    no rashes, no sclerodactyly, no Raynaud's, no periungual erythema, no digital tip tapering, no nailbed pitting  HEAD:   no alopecia, no scalp tenderness, no temporal artery tenderness or induration.  EYES:   no pallor, no icterus, PERRLA  ENT:     no thrush, no mucosal dryness or ulcerations, adequate oral hygiene & dentition.  NECK:  supple x 6, no masses, no thyromegaly, no lymphadenopathy.  CV:        S1 and S2 regular, no murmurs, gallop or rubs  CHEST: Normal respiratory effort;  normal breath sounds/no adventitious sounds. No signs of consolidation.  ABD:      non-tender and non-distended; soft; normal bowel sounds; no rebound, guarding, or tenderness. No hepatosplenomegaly.  Musculoskeletal:  Examination of the right wrist and hand demonstrate no evidence of synovial proliferation. Some squaring of the CMC joint is noted though there is no tenderness, redness, or warmth overlying the CMC joint.There is no interosseous atrophy. The MCPs demonstrate no chronic synovial proliferation. There are no subluxations. Heberden's and Marito's nodes are found on all digits though no significant deformities are noted.  strength is adequate. Is no evidence of neurovascular compromise. Examination of the left wrist and hand demonstrate moderately advanced chronic synovial proliferation involving the entire wrist. There is some low-grade warmth and moderate tenderness on compression of the joint. Extension is limited to 60° with pain beyond that point; flexion is limited to 45° with pain beyond that point. It was impossible to determine full passive range of motion. There is somewhat more CMC squaring of the left thumb than the right. Otherwise,the changes of degenerative arthritis are symmetrical. There is no significant abnormality noted of any proximal large joints in either the upper or lower extremities. There is no metatarsalgia. Strength was 5/5x4. There is no tenderness of the  proximal musculature. Range of motion of the back was full. Posture is normal. The gait is brisk and stable.  EXTREM: no clubbing, cyanosis or edema. normal pulses. Marina's sign is negative bilaterally. Tinel's sign is negative bilaterally  NEURO:  grossly intact; motor/sensory WNL; no tremors  PSYCH:  normal mood, affect and behavior            Labs:   Lab Results   Component Value Date    WBC 6.3 12/06/2018    HGB 12.8 12/06/2018    HCT 38.5 12/06/2018    MCV 95.5 12/06/2018     12/06/2018   CMP@  Sodium   Date Value Ref Range Status   12/06/2018 136 135 - 146 mmol/L Final     Potassium   Date Value Ref Range Status   12/06/2018 4.1 3.5 - 5.3 mmol/L Final     Chloride   Date Value Ref Range Status   12/06/2018 99 98 - 110 mmol/L Final     CO2   Date Value Ref Range Status   12/06/2018 27 20 - 32 mmol/L Final     Glucose   Date Value Ref Range Status   12/06/2018 108 (H) 65 - 99 mg/dL Final     Comment:                   Fasting reference interval     For someone without known diabetes, a glucose value  between 100 and 125 mg/dL is consistent with  prediabetes and should be confirmed with a  follow-up test.          BUN, Bld   Date Value Ref Range Status   12/06/2018 10 7 - 25 mg/dL Final     Creatinine   Date Value Ref Range Status   12/06/2018 0.53 0.50 - 0.99 mg/dL Final     Comment:     For patients >49 years of age, the reference limit  for Creatinine is approximately 13% higher for people  identified as -American.          Calcium   Date Value Ref Range Status   12/06/2018 9.5 8.6 - 10.4 mg/dL Final     Total Protein   Date Value Ref Range Status   12/06/2018 7.4 6.1 - 8.1 g/dL Final     Albumin   Date Value Ref Range Status   12/06/2018 4.7 3.6 - 5.1 g/dL Final     Total Bilirubin   Date Value Ref Range Status   12/06/2018 0.6 0.2 - 1.2 mg/dL Final     Alkaline Phosphatase   Date Value Ref Range Status   12/06/2018 88 33 - 130 U/L Final     AST   Date Value Ref Range Status   12/06/2018 14 10 -  35 U/L Final     ALT   Date Value Ref Range Status   12/06/2018 12 6 - 29 U/L Final         Radiology/Diagnostic Studies:    WRIST BX:  DIAGNOSIS:   12/27/18 JAR/kl    SOFT TISSUE, LEFT DORSAL WRIST MASS, BIOPSY:  - SOFT TISSUE WITH CHRONIC INFLAMMATION.    - CONSISTENT WITH CHRONIC SYNOVITIS    Assessment/Discussion/Plan:   63 y.o. female with 6 months of monoarthritis of the left wrist with biopsy-proven synovitis      PLAN:  His may well turn out to be rheumatoid arthritis. Blood testing done recently showed a negative rheumatoid factor and CCP as well as a negative sedimentation rate and CRP. It is not uncommon for all such indicators to be unhelpful in disease that is as limited as this. However, considering that this presentation is hardly common and that O's tests are negative, thoughts of other causes of monoarthritis need to be investigated. Those include infectious causes such as GC or rickettsial disease ( the patient resided in [place] several years ago). Additionally, this presentation can be seen in granulomatous diseases such as sarcoidosis. It may also be present in spondyloarthropathies.    I'm going to begin the workup with blood tests that include an ACE level, an SPEP, an anti-SSA, an HLA-B27, and Lyme titers. Additionally, I have ordered a chest x-ray, sacroiliac x-ray series, and comparison views of both wrists and hands.    I'm going to give her a steroid challenge as follows: Prednisone 15 mg daily for 1 week, to be followed by 10 mg daily for a second week, and finally 5 mg daily for the third week. I asked her to keep notes while on this 3 week challenge so that I may determine whether this is steroid responsive and if so, at what dose it ceases to respond.    I will attempt to view the biopsy slides myself. I would also like to review imaging that has been performed. Advanced images of the left wrist may be required.    RTC:  I will see her back in 4 weeks.      Electronically signed by  Flo Rodriguez MD

## 2019-03-21 LAB
ALBUMIN SERPL-MCNC: 4 G/DL (ref 2.9–4.4)
ALBUMIN/GLOB SERPL ELPH: 1.2 {RATIO} (ref 0.7–1.7)
ALPHA 1 GLOBULIN/PROTEIN TOTAL: 0.3 G/DL (ref 0–0.4)
ALPHA 2 GLOBULIN/PROTEIN TOTAL: 0.9 G/DL (ref 0.4–1)
B-GLOBULIN FLD ELPH-MCNC: 1.2 G/DL (ref 0.7–1.3)
CCP ANTIBODIES IGG/IGA: 5 UNITS (ref 0–19)
GAMMA GLOB FLD ELPH-MCNC: 0.9 G/DL (ref 0.4–1.8)
GLOBULIN SER CALC-MCNC: 3.3 G/DL (ref 2.2–3.9)
Lab: NORMAL
M PROTEIN MFR UR ELPH: NORMAL G/DL
PROT SERPL-MCNC: 7.3 G/DL (ref 6–8.5)

## 2019-03-25 LAB — HLA B27 INTERPRETATION: NEGATIVE

## 2019-04-03 ENCOUNTER — OFFICE VISIT (OUTPATIENT)
Dept: RHEUMATOLOGY | Facility: CLINIC | Age: 64
End: 2019-04-03
Payer: COMMERCIAL

## 2019-04-03 VITALS
DIASTOLIC BLOOD PRESSURE: 83 MMHG | BODY MASS INDEX: 28.06 KG/M2 | WEIGHT: 158.38 LBS | SYSTOLIC BLOOD PRESSURE: 136 MMHG

## 2019-04-03 DIAGNOSIS — M19.90 CHRONIC INFLAMMATORY ARTHRITIS: Primary | ICD-10-CM

## 2019-04-03 PROCEDURE — 3079F DIAST BP 80-89 MM HG: CPT | Mod: ,,, | Performed by: INTERNAL MEDICINE

## 2019-04-03 PROCEDURE — 3075F SYST BP GE 130 - 139MM HG: CPT | Mod: ,,, | Performed by: INTERNAL MEDICINE

## 2019-04-03 PROCEDURE — 3008F PR BODY MASS INDEX (BMI) DOCUMENTED: ICD-10-PCS | Mod: ,,, | Performed by: INTERNAL MEDICINE

## 2019-04-03 PROCEDURE — 3079F PR MOST RECENT DIASTOLIC BLOOD PRESSURE 80-89 MM HG: ICD-10-PCS | Mod: ,,, | Performed by: INTERNAL MEDICINE

## 2019-04-03 PROCEDURE — 3075F PR MOST RECENT SYSTOLIC BLOOD PRESS GE 130-139MM HG: ICD-10-PCS | Mod: ,,, | Performed by: INTERNAL MEDICINE

## 2019-04-03 PROCEDURE — 99214 PR OFFICE/OUTPT VISIT, EST, LEVL IV, 30-39 MIN: ICD-10-PCS | Mod: ,,, | Performed by: INTERNAL MEDICINE

## 2019-04-03 PROCEDURE — 3008F BODY MASS INDEX DOCD: CPT | Mod: ,,, | Performed by: INTERNAL MEDICINE

## 2019-04-03 PROCEDURE — 99214 OFFICE O/P EST MOD 30 MIN: CPT | Mod: ,,, | Performed by: INTERNAL MEDICINE

## 2019-04-03 RX ORDER — HYDROXYCHLOROQUINE SULFATE 200 MG/1
200 TABLET, FILM COATED ORAL 2 TIMES DAILY
Qty: 60 TABLET | Refills: 5 | Status: SHIPPED | OUTPATIENT
Start: 2019-04-03 | End: 2019-05-06

## 2019-04-03 NOTE — PROGRESS NOTES
[facility] RHEUMATOLOGY           Follow-up visit    Notes dictated via Dragon to EPIC. Please forgive any unintended errors.  Subjective:       Patient ID:   NAME: Yudith Haider : 1955     63 y.o. female    Referring Doc: No ref. provider found  Other Physicians:    Chief Complaint:  Chronic inflammatory arthritis      HPI/Interval History:   The patient is somewhat better with her prednisone trial. She has much less swelling and pain in the left wrist though it does remain tender to touch and painful on movement.          ROS:   GEN:    no fever, night sweats or weight loss  SKIN:   no rashes , erythema, bruising, or swelling, no Raynauds, no photosensitivity  HEENT: no changes in vision, no mouth ulcers, no sicca symptoms, no scalp tenderness, no jaw claudication.  CV:      no CP, PND, GRIER or orthopnea, no palpitations  PULM: no SOB, cough, hemoptysis, sputum or pleuritic pain  GI:       no GERD, no dysphagia, no abdominal pain, nausea, vomiting, constipation, diarrhea, melanotic stools, BRBPR, or hematemesis  :      no hematuria, dysuria  NEURO: no paresthesias, headaches, acute visual disturbances  MUSCULOSKELETAL:  pain and swelling involving the left wrist, moderately improved with steroids  PSYCH:   No insomnia, no increased anxiety or depression    Medications:    Current Outpatient Medications:     acetaminophen (TYLENOL ARTHRITIS ORAL), Take 3 tablets by mouth., Disp: , Rfl:     amLODIPine (NORVASC) 2.5 MG tablet, Take 1 tablet (2.5 mg total) by mouth Daily., Disp: 90 tablet, Rfl: 1    bisoprolol (ZEBETA) 10 MG tablet, Take 1 tablet (10 mg total) by mouth once daily., Disp: 90 tablet, Rfl: 1    levothyroxine (SYNTHROID) 112 MCG tablet, Take 1 tablet (112 mcg total) by mouth once daily., Disp: 90 tablet, Rfl: 1    predniSONE (DELTASONE) 5 MG tablet, Three daily x week one, then two daily x 1 week two, then one daily x week three., Disp: 42 tablet, Rfl: 0    UBIDECARENONE (COENZYME Q10)  60 mg Cap, Take 1 capsule by mouth Daily., Disp: , Rfl:     hydroxychloroquine (PLAQUENIL) 200 mg tablet, Take 1 tablet (200 mg total) by mouth 2 (two) times daily., Disp: 60 tablet, Rfl: 5  No current facility-administered medications for this visit.     Facility-Administered Medications Ordered in Other Visits:     lactated ringers infusion, , Intravenous, Continuous, Blake Quach MD, Stopped at 12/26/18 0915      FAMILY HISTORY: negative for Connective Tissue Disease        Review of patient's allergies indicates:   Allergen Reactions    Lisinopril      Other reaction(s): Cough             Objective:     Vitals:  Blood pressure 136/83, weight 71.8 kg (158 lb 6.4 oz).    Physical Examination:   GEN: wn/wd female in no apparent distress  SKIN: no rashes, no sclerodactyly, no Raynaud's, no periungual erythema, no digital tip ulcerations, no nailbed pitting  HEAD: no alopecia, no scalp tenderness, no temporal artery tenderness or induration.  EYES: no pallor, no icterus, PERRLA  ENT:  no thrush, no mucosal dryness or ulcerations, adequate oral hygiene & dentition.  NECK: supple x 6, no masses, no thyromegaly, no lymphadenopathy.  CV:   S1 and S2 regular, no murmurs, gallop or rubs  CHEST: Normal respiratory effort;  normal breath sounds/no adventitious sounds. No signs of consolidation.  ABD: non-tender and non-distended; soft; normal bowel sounds; no rebound/guarding or tenderness. No hepatosplenomegaly.  Musculoskeletal:  Warmth, synovial proliferation, tenderness on compression and pain on range of motion is present in the left wrist and left CMC.  EXTREM: no clubbing, cyanosis or edema. normal pulses.  NEURO:  grossly intact; motor/sensory WNL; no tremors  PSYCH:  normal mood, affect and behavior            Labs:   Lab Results   Component Value Date    WBC 6.3 12/06/2018    HGB 12.8 12/06/2018    HCT 38.5 12/06/2018    MCV 95.5 12/06/2018     12/06/2018   CMP@  Sodium   Date Value Ref Range  Status   12/06/2018 136 135 - 146 mmol/L Final     Potassium   Date Value Ref Range Status   12/06/2018 4.1 3.5 - 5.3 mmol/L Final     Chloride   Date Value Ref Range Status   12/06/2018 99 98 - 110 mmol/L Final     CO2   Date Value Ref Range Status   12/06/2018 27 20 - 32 mmol/L Final     Glucose   Date Value Ref Range Status   12/06/2018 108 (H) 65 - 99 mg/dL Final     Comment:                   Fasting reference interval     For someone without known diabetes, a glucose value  between 100 and 125 mg/dL is consistent with  prediabetes and should be confirmed with a  follow-up test.          BUN, Bld   Date Value Ref Range Status   12/06/2018 10 7 - 25 mg/dL Final     Creatinine   Date Value Ref Range Status   12/06/2018 0.53 0.50 - 0.99 mg/dL Final     Comment:     For patients >49 years of age, the reference limit  for Creatinine is approximately 13% higher for people  identified as -American.          Calcium   Date Value Ref Range Status   12/06/2018 9.5 8.6 - 10.4 mg/dL Final     Total Protein   Date Value Ref Range Status   03/18/2019 7.3 6.0 - 8.5 g/dL      Albumin   Date Value Ref Range Status   03/18/2019 4.0 2.9 - 4.4 g/dL      Total Bilirubin   Date Value Ref Range Status   12/06/2018 0.6 0.2 - 1.2 mg/dL Final     Alkaline Phosphatase   Date Value Ref Range Status   12/06/2018 88 33 - 130 U/L Final     AST   Date Value Ref Range Status   12/06/2018 14 10 - 35 U/L Final     ALT   Date Value Ref Range Status   12/06/2018 12 6 - 29 U/L Final         Radiology/Diagnostic Studies:    None    Assessment/Discussion/Plan:   63 y.o. female with likely early seronegative rheumatoid arthritis, partially steroid responsive      PLAN:  The workup did not show other causes of monoarthritis such as sarcoidosis. The biopsy is consistent with synovial proliferation. I discussed the options with her and we both agreed to start hydroxychloroquine now. I have discussed the drug with her including side effects. I have  also provided her with literature on the drug. She is to have a baseline eye examination done within the next 60 days. She will wean off prednisone per the original schedule.  I will not perform blood testing today though I will see her back in 2 months and perform blood testing then.    RTC:  2 months      Electronically signed by Flo Rodriguez MD

## 2019-04-03 NOTE — PATIENT INSTRUCTIONS
Hydroxychloroquine tablets  What is this medicine?  HYDROXYCHLOROQUINE (anibal drox ee KLOR oh kwin) is used to treat rheumatoid arthritis and systemic lupus erythematosus. It is also used to treat malaria.  How should I use this medicine?  Take this medicine by mouth with a glass of water. Follow the directions on the prescription label. If this medicine upsets your stomach take it with food or milk. Take your doses at regular intervals. Do not take your medicine more often than directed.  Talk to your pediatrician regarding the use of this medicine in children. Special care may be needed.  What side effects may I notice from receiving this medicine?  Side effects that you should report to your doctor or health care professional as soon as possible:  · allergic reactions like skin rash, itching or hives, swelling of the face, lips, or tongue  · change in vision  · fever, infection  · hearing loss or ringing  · muscle weakness, tremor, or numbness  · redness, blistering, peeling or loosening of the skin, including inside the mouth  · seizures  · unusual bleeding or bruising  · unusually weak or tired  Side effects that usually do not require medical attention (report to your doctor or health care professional if they continue or are bothersome):  · change in coloration of the mouth or skin  · dizziness  · hair loss, lightening  · headache  · irritability, nervousness, nightmares  · loss of appetite  · stomach upset, diarrhea  What may interact with this medicine?  · antacids  · botulinum toxins  · digoxin  · kaolin  · penicillamine  What if I miss a dose?  If you miss a dose, take it as soon as you can. If it is almost time for your next dose, take only that dose. Do not take double or extra doses.  Where should I keep my medicine?  Keep out of the reach of children. In children, this medicine can cause overdose with small doses.  Store at room temperature between 15 and 30 degrees C (59 and 86 degrees F). Protect  from moisture and light. Throw away any unused medicine after the expiration date.  What should I tell my health care provider before I take this medicine?  They need to know if you have any of these conditions:  · alcoholism  · anemia or other blood disorder  · eye disease  · glucose 6-phosphate dehydrogenase (G6PD) deficiency  · liver disease  · porphyria  · psoriasis  · an unusual or allergic reaction to chloroquine, hydroxychloroquine, other medicines, foods, dyes, or preservatives  · pregnant or trying to get pregnant  · breast-feeding  What should I watch for while using this medicine?  Visit your doctor or health care professional for regular check ups. Tell your doctor if your symptoms do not improve. Arthritis symptoms may take several weeks to improve. If you are taking this medicine for a long time, you will need important blood work done. You will also need to have your eyes checked as directed.  This medicine can make you more sensitive to the sun. Keep out of the sun. If you cannot avoid being in the sun, wear protective clothing and use sunscreen. Do not use sun lamps or tanning beds/booths.  Avoid antacids and kaolin containing products for 2 hours before and after taking a dose of this medicine.  NOTE:This sheet is a summary. It may not cover all possible information. If you have questions about this medicine, talk to your doctor, pharmacist, or health care provider. Copyright© 2017 [employer]

## 2019-04-04 ENCOUNTER — OFFICE VISIT (OUTPATIENT)
Dept: FAMILY MEDICINE | Facility: CLINIC | Age: 64
End: 2019-04-04
Payer: COMMERCIAL

## 2019-04-04 VITALS
OXYGEN SATURATION: 98 % | HEIGHT: 63 IN | SYSTOLIC BLOOD PRESSURE: 122 MMHG | DIASTOLIC BLOOD PRESSURE: 80 MMHG | BODY MASS INDEX: 27.85 KG/M2 | WEIGHT: 157.19 LBS | HEART RATE: 78 BPM

## 2019-04-04 DIAGNOSIS — E66.3 OVERWEIGHT (BMI 25.0-29.9): ICD-10-CM

## 2019-04-04 DIAGNOSIS — I10 BENIGN HYPERTENSION: Primary | ICD-10-CM

## 2019-04-04 DIAGNOSIS — E78.2 MULTIPLE-TYPE HYPERLIPIDEMIA: ICD-10-CM

## 2019-04-04 DIAGNOSIS — E03.9 ACQUIRED HYPOTHYROIDISM: ICD-10-CM

## 2019-04-04 DIAGNOSIS — M19.90 ARTHRITIS: ICD-10-CM

## 2019-04-04 PROCEDURE — 3079F DIAST BP 80-89 MM HG: CPT | Mod: ,,, | Performed by: NURSE PRACTITIONER

## 2019-04-04 PROCEDURE — 3008F PR BODY MASS INDEX (BMI) DOCUMENTED: ICD-10-PCS | Mod: ,,, | Performed by: NURSE PRACTITIONER

## 2019-04-04 PROCEDURE — 3074F SYST BP LT 130 MM HG: CPT | Mod: ,,, | Performed by: NURSE PRACTITIONER

## 2019-04-04 PROCEDURE — 99214 PR OFFICE/OUTPT VISIT, EST, LEVL IV, 30-39 MIN: ICD-10-PCS | Mod: ,,, | Performed by: NURSE PRACTITIONER

## 2019-04-04 PROCEDURE — 3008F BODY MASS INDEX DOCD: CPT | Mod: ,,, | Performed by: NURSE PRACTITIONER

## 2019-04-04 PROCEDURE — 3079F PR MOST RECENT DIASTOLIC BLOOD PRESSURE 80-89 MM HG: ICD-10-PCS | Mod: ,,, | Performed by: NURSE PRACTITIONER

## 2019-04-04 PROCEDURE — 3074F PR MOST RECENT SYSTOLIC BLOOD PRESSURE < 130 MM HG: ICD-10-PCS | Mod: ,,, | Performed by: NURSE PRACTITIONER

## 2019-04-04 PROCEDURE — 99214 OFFICE O/P EST MOD 30 MIN: CPT | Mod: ,,, | Performed by: NURSE PRACTITIONER

## 2019-04-04 RX ORDER — BISOPROLOL FUMARATE 10 MG/1
10 TABLET, FILM COATED ORAL DAILY
Qty: 90 TABLET | Refills: 1 | Status: SHIPPED | OUTPATIENT
Start: 2019-04-04 | End: 2021-08-02

## 2019-04-04 RX ORDER — LEVOTHYROXINE SODIUM 112 UG/1
112 TABLET ORAL DAILY
Qty: 90 TABLET | Refills: 1 | Status: SHIPPED | OUTPATIENT
Start: 2019-04-04 | End: 2021-08-02

## 2019-04-04 RX ORDER — AMLODIPINE BESYLATE 2.5 MG/1
2.5 TABLET ORAL DAILY
Qty: 90 TABLET | Refills: 1 | Status: SHIPPED | OUTPATIENT
Start: 2019-04-04 | End: 2021-08-02

## 2019-04-04 NOTE — PROGRESS NOTES
Subjective:       Patient ID: Yudith Haider is a 63 y.o. female.    Chief Complaint: Hypertension (6 mo. f/u); Thyroid Problem (6 mo. f/u); and Hyperlipidemia (6 mo. f/u)    Patient presents today for follow-up on chronic conditions of hypertension, hyperlipidemia, and hypothyroidism. Patient is stable and doing well on current medications. Patient also states that she has been recently diagnosed with rheumatoid arthritis affecting the hands. Patient sees rheumatology for this and was recently started on plaquenil for management.  Patient is overweight with a BMI of 27.85    Hyperlipidemia   This is a chronic problem. The current episode started more than 1 year ago. The problem is controlled. She has no history of chronic renal disease, diabetes, hypothyroidism, liver disease, obesity or nephrotic syndrome. Factors aggravating her hyperlipidemia include fatty foods. Pertinent negatives include no chest pain, focal sensory loss, focal weakness, leg pain, myalgias or shortness of breath. Current antihyperlipidemic treatment includes diet change and exercise. The current treatment provides moderate improvement of lipids. Compliance problems include adherence to diet, adherence to exercise and medication side effects.  Risk factors for coronary artery disease include dyslipidemia, hypertension, post-menopausal, a sedentary lifestyle and stress.   Thyroid Problem   Presents for follow-up visit. Patient reports no anxiety, cold intolerance, constipation, depressed mood, diaphoresis, diarrhea, dry skin, fatigue, hair loss, heat intolerance, hoarse voice, leg swelling, menstrual problem, nail problem, palpitations, tremors, visual change, weight gain or weight loss. The symptoms have been stable. Her past medical history is significant for hyperlipidemia. There is no history of diabetes or heart failure.   Hypertension   This is a chronic problem. The current episode started more than 1 year ago. The problem has been  waxing and waning since onset. The problem is controlled. Pertinent negatives include no anxiety, blurred vision, chest pain, headaches, malaise/fatigue, neck pain, orthopnea, palpitations, peripheral edema, PND, shortness of breath or sweats. There are no associated agents to hypertension. Risk factors for coronary artery disease include stress, dyslipidemia, post-menopausal state and sedentary lifestyle. Past treatments include beta blockers and calcium channel blockers. The current treatment provides significant improvement. Compliance problems include diet and exercise.  There is no history of angina, kidney disease, CAD/MI, CVA, heart failure, left ventricular hypertrophy, PVD or retinopathy. Identifiable causes of hypertension include a thyroid problem. There is no history of chronic renal disease.     Review of Systems   Constitutional: Negative for activity change, appetite change, diaphoresis, fatigue, fever, malaise/fatigue, weight gain and weight loss.        Overweight   HENT: Negative for congestion, ear discharge, ear pain, hoarse voice, sore throat, trouble swallowing and voice change.    Eyes: Negative for blurred vision, photophobia, pain, discharge and visual disturbance.   Respiratory: Negative for cough, chest tightness and shortness of breath.    Cardiovascular: Negative for chest pain, palpitations, orthopnea and PND.        Hypertension, hyperlipidemia   Gastrointestinal: Negative for abdominal pain, constipation, diarrhea, nausea and vomiting.   Endocrine: Negative for cold intolerance and heat intolerance.        Hypothyroid   Genitourinary: Negative for difficulty urinating, dysuria and menstrual problem.   Musculoskeletal: Positive for arthralgias. Negative for gait problem, myalgias and neck pain.        RA   Skin: Negative for rash.   Allergic/Immunologic: Negative for immunocompromised state.   Neurological: Negative for tremors, focal weakness, speech difficulty and headaches.    Psychiatric/Behavioral: Negative for confusion, self-injury and suicidal ideas. The patient is not nervous/anxious.        Past Medical History:   Diagnosis Date    History of pneumonia     Hyperlipidemia     Hypertension     Hypothyroidism       Past Surgical History:   Procedure Laterality Date    BIOPSY, MASS, UPPER EXTREMITY/ dorsal wrist Left 2018    [facility]    CARPAL TUNNEL RELEASE Bilateral     HAND SURGERY Right 2018    removal of for. object    HYSTERECTOMY  1981    INJECTION-STEROID-EPIDURAL-CERVICAL N/A 2017    Performed by [facility] at [facility]    INJECTION-STEROID-EPIDURAL-CERVICAL N/A 2017    Performed by [facility] at [facility]    THYROIDECTOMY         Family History   Problem Relation Age of Onset    Hypertension Mother     Diabetes Mother     Stroke Father     Hypertension Father        Social History     Socioeconomic History    Marital status:      Spouse name: Not on file    Number of children: Not on file    Years of education: Not on file    Highest education level: Not on file   Occupational History    Not on file   Social Needs    Financial resource strain: Not on file    Food insecurity:     Worry: Not on file     Inability: Not on file    Transportation needs:     Medical: Not on file     Non-medical: Not on file   Tobacco Use    Smoking status: Former Smoker     Types: Cigarettes     Last attempt to quit: 2003     Years since quittin.2    Smokeless tobacco: Never Used   Substance and Sexual Activity    Alcohol use: Yes     Comment: occassional    Drug use: No    Sexual activity: Not on file   Lifestyle    Physical activity:     Days per week: Not on file     Minutes per session: Not on file    Stress: Not on file   Relationships    Social connections:     Talks on phone: Not on file     Gets together: Not on file     Attends [employer] service: Not on file     Active member  "of club or organization: Not on file     Attends meetings of clubs or organizations: Not on file     Relationship status: Not on file   Other Topics Concern    Not on file   Social History Narrative    Not on file       Current Outpatient Medications   Medication Sig Dispense Refill    acetaminophen (TYLENOL ARTHRITIS ORAL) Take 3 tablets by mouth as needed.       amLODIPine (NORVASC) 2.5 MG tablet Take 1 tablet (2.5 mg total) by mouth Daily. 90 tablet 1    bisoprolol (ZEBETA) 10 MG tablet Take 1 tablet (10 mg total) by mouth once daily. 90 tablet 1    hydroxychloroquine (PLAQUENIL) 200 mg tablet Take 1 tablet (200 mg total) by mouth 2 (two) times daily. 60 tablet 5    levothyroxine (SYNTHROID) 112 MCG tablet Take 1 tablet (112 mcg total) by mouth once daily. 90 tablet 1    UBIDECARENONE (COENZYME Q10) 60 mg Cap Take 1 capsule by mouth Daily.       No current facility-administered medications for this visit.      Facility-Administered Medications Ordered in Other Visits   Medication Dose Route Frequency Provider Last Rate Last Dose    lactated ringers infusion   Intravenous Continuous Blake Quach MD   Stopped at 12/26/18 0915       Review of patient's allergies indicates:   Allergen Reactions    Lisinopril      Other reaction(s): Cough     Objective:    HPI     Hypertension      Additional comments: 6 mo. f/u              Thyroid Problem      Additional comments: 6 mo. f/u              Hyperlipidemia      Additional comments: 6 mo. f/u          Last edited by Daija Haque LPN on 4/4/2019  9:19 AM. (History)      Blood pressure 122/80, pulse 78, height 5' 3" (1.6 m), weight 71.3 kg (157 lb 3.2 oz), SpO2 98 %. Body mass index is 27.85 kg/m².   Physical Exam   Constitutional: She is oriented to person, place, and time. She appears well-developed. She is cooperative. No distress.   overweight   HENT:   Head: Normocephalic and atraumatic.   Right Ear: Tympanic membrane normal.   Left Ear: Tympanic " membrane normal.   Nose: Nose normal.   Mouth/Throat: Oropharynx is clear and moist.   Eyes: Pupils are equal, round, and reactive to light. Conjunctivae, EOM and lids are normal. Lids are everted and swept, no foreign bodies found. Right pupil is round and reactive. Left pupil is round and reactive.   Neck: Trachea normal and normal range of motion. Neck supple. No thyromegaly present.   Cardiovascular: Normal rate, regular rhythm, S1 normal, S2 normal, normal heart sounds and intact distal pulses.   No murmur heard.  Pulmonary/Chest: Effort normal and breath sounds normal. No respiratory distress. She has no wheezes.   Abdominal: Soft. Bowel sounds are normal. There is no rigidity and no guarding.   Lymphadenopathy:     She has no cervical adenopathy.     She has no axillary adenopathy.   Neurological: She is alert and oriented to person, place, and time.   Skin: Skin is warm and dry. Capillary refill takes less than 2 seconds.   Psychiatric: She has a normal mood and affect. Her behavior is normal. Judgment and thought content normal.   Nursing note and vitals reviewed.          Assessment:       1. Benign hypertension    2. Multiple-type hyperlipidemia    3. Acquired hypothyroidism    4. Arthritis    5. Overweight (BMI 25.0-29.9)        Plan:       Yudith was seen today for hypertension, thyroid problem and hyperlipidemia.    Diagnoses and all orders for this visit:    Benign hypertension  -     bisoprolol (ZEBETA) 10 MG tablet; Take 1 tablet (10 mg total) by mouth once daily.  -     amLODIPine (NORVASC) 2.5 MG tablet; Take 1 tablet (2.5 mg total) by mouth Daily.  -     CBC auto differential; Future  -     Comprehensive metabolic panel; Future  -     Urinalysis; Future  -     CBC auto differential  -     Comprehensive metabolic panel  -     Urinalysis  -Stable  -Lifestyle changes: Reduce the amount of salt in your diet; Lose weight; Avoid drinking too much alcohol; Exercise at least 30 minutes per day most days  of the week.  Continue current medications and home BP monitoring.     Multiple-type hyperlipidemia  -     Lipid panel; Future  -     Lipid panel  -Limit red meat, butter, fried foods, cheese, and other foods that have a lot of saturated fat. Consume more: lean meats, fish, fruits, vegetables, whole grains, beans, lentils, and nuts.  Weight loss, and 30-45 min of cardiovascular exercise daily.     Acquired hypothyroidism  -     levothyroxine (SYNTHROID) 112 MCG tablet; Take 1 tablet (112 mcg total) by mouth once daily.  -     TSH; Future  -     TSH    Arthritis  -Continue seeing rheumatology for management.    Overweight (BMI 25.0-29.9)  -The patient is asked to make an attempt to improve diet and exercise patterns to aid in medical management of this problem.    Follow up in 3 months for thyroid, hypertension and hyperlipidemia with labs prior to office visit.  Labs sent to [facility].

## 2019-04-04 NOTE — PATIENT INSTRUCTIONS
Taking Your Blood Pressure  Blood pressure is the force of blood against the artery wall as it moves from the heart through the blood vessels. You can take your own blood pressure reading using a digital monitor. Take your readings the same each time, using the same arm. Take readings as often as your healthcare provider instructs.  About blood pressure monitors  Blood pressure monitors are designed for certain ages and cases. You can find monitors for older adults, for pregnant women, and for children. Make sure the one you choose is the right one for your age and situation.  The American Heart Association recommends an automatic cuff monitor that fits on your upper arm (bicep). The cuff should fit your arm size. A cuff thats too large or too small will not give an accurate reading. Measure around your upper arm to find your size.  Monitors that attach to your finger or wrist are not as accurate as monitors for your upper arm.  Ask your healthcare provider for help in choosing a monitor. Bring your monitor to your next provider visit if you need help in using it the correct way.  The steps below are general instructions for using an automatic digital monitor.  Step 1. Relax    · Take your blood pressure at the same time every day, such as in the morning or evening, or at the time your healthcare provider recommends.  · Wait at least a half-hour after smoking, eating, or exercising. Don't drink coffee, tea, soda, or other caffeinated beverages before checking your blood pressure.  · Sit comfortably at a table with both feet on the floor. Do not cross your legs or feet. Place the monitor near you.  · Rest for a few minutes before you begin.  Step 2. Wrap the cuff    · Place your arm on the table, palm up. Your arm should be at the level of your heart. Wrap the cuff around your upper arm, just above your elbow. Its best done on bare skin, not over clothing. Most cuffs will indicate where the brachial artery (the  blood vessel in the middle of the arm at the inner side of the elbow) should line up with the cuff. Look in your monitor's instruction booklet for an illustration. You can also bring your cuff to your healthcare provider and have them show you how to correctly place the cuff.  Step 3. Inflate the cuff    · Push the button that starts the pump.  · The cuff will tighten, then loosen.  · The numbers will change. When they stop changing, your blood pressure reading will appear.  · Take 2 or 3 readings one minute apart.  Step 4. Write down the results of each reading    · Write down your blood pressure numbers for each reading. Note the date and time. Keep your results in one place, such as a notebook. Even if your monitor has a built-in memory, keep a hard copy of the readings.  · Remove the cuff from your arm. Turn off the machine.  · Bring your blood pressure records with your healthcare providers at each visit.  · If you start a new blood pressure medicine, note the day you started the new medicine. Also note the day if you change the dose of your medicine. This information goes on your blood pressure recording sheet. This will help your healthcare provider monitor how well the medicine changes are working.  · Ask your healthcare provider what numbers should prompt you to call him or her. Also ask what numbers should prompt you to get help right away.  Date Last Reviewed: 11/1/2016 © 2000-2017 [employer]. [address] [place] [address], [place] [address]. All rights reserved. This information is not intended as a substitute for professional medical care. Always follow your healthcare professional's instructions.        Lifestyle Changes to Control Cholesterol  You can control your cholesterol through diet, exercise, weight management, quitting smoking, stress management, and taking your medicines right. These things can also lower your risk for cardiovascular disease.    Eating healthy  Your healthcare  provider will give you information on diet changes you may need to make. Your provider may recommend that you see a registered dietitian for help with diet changes. Changes may include:  · Cutting back on the amount of fat and cholesterol in your meals  · Eating less salt (sodium). This is especially important if you have high blood pressure.  · Eating more fresh vegetables and fruits  · Eating lean proteins such as fish, poultry, beans, and peas  · Eating less red meat and processed meats  · Using low-fat dairy products  · Using vegetable and nut oils in limited amounts  · Limiting how many sweets and processed foods like chips, cookies, and baked goods that you eat   · Limiting how many sugar-sweetened beverages you drink  · Limiting how often you eat out  Getting exercise  Regular exercise is a good way to help your body control cholesterol. Regular exercise can help in many ways. It can:  · Raise your good cholesterol  · Help lower your bad cholesterol  · Let blood flow better through your body  · Give more oxygen to your muscles and tissues  · Help you manage your weight  · Help your heart pump better  · Lower your blood pressure  Your healthcare provider may recommend that you get more physical activity if you haven't been active. Your provider may recommend that you get moderate to vigorous physical activity for at least 40 minutes each day. You should do this for at least 3 to 4 days each week. A few examples of moderate to vigorous activity are:  · Walking at a brisk pace. This is about 3 to 4 miles per hour.  · Jogging or running  · Swimming or water aerobics  · Hiking  · Dancing  · Martial arts  · Tennis  · Riding a bicycle or stationary bike  · Dancing  Managing your weight  If you are overweight or obese, your healthcare provider will work with you to help you lose weight and lower your BMI (body mass index). Making diet changes and getting more physical activity can help. Changing your diet will help  you lose weight more easily than adding exercise.  Quitting smoking  Smoking and other tobacco use can raise cholesterol and make it harder to control. Quitting is tough. But millions of people have given up tobacco for good. You can quit, too! Think about some of the reasons below to quit smoking. Do any of them make you think twice about your smoking habit?  Stop smoking because it:  · Keeps your cholesterol high, even if you make all the other changes youre supposed to  · Damages your body. It especially harms your heart, lungs, skin, and blood vessels.  · Makes you more likely to have a heart attack (acute myocardial infarction), stroke, or cancer  · Stains your teeth  · Makes your skin, clothes, and breath smell bad  · Costs a lot of money  Controlling stress   Learn ways to control stress. This will help you deal with stress in your home and work life. Controlling stress can greatly lower your risk of getting cardiovascular disease.  Making the most of medicines  Healthy eating and exercise are a good start to keeping your cholesterol down. But you may need some extra help from medicine. If your doctor prescribes medicine, be sure to take it exactly as directed. Remember:  · Tell your healthcare provider about all other medicines you take. This includes vitamins and herbs.  · Tell your healthcare provider if you have any side effects after starting to take a medicine. Examples of side effects to watch for include muscle aches, weakness, blurred vision, rust-colored urine, yellowing of eyes or skin (jaundice), and headache.  · Dont skip a dose or stop taking your medicine because you feel better or because your cholesterol numbers go down. Never stop taking your medicine unless your healthcare provider has told you its OK.  · Ask your healthcare provider if you have any questions about your medicines.  High risk groups  Some people may need to take medicines called statins to control their cholesterol. This  is in addition to eating a healthy diet and getting regular exercise.  Statins can help you stay healthy. They can also help prevent a heart attack or stroke. You may need to take a statin if you are in one of these groups:  · Adults who have had a heart attack or stroke. Or adults who have had peripheral vascular disease, a ministroke (transient ischemic attack), or stable or unstable angina. This group also includes people who have had a procedure to restore blood flow through a blocked artery. These procedures include percutaneous coronary intervention, angioplasty, stent, and open-heart bypass surgery.  · Adults who have diabetes. Or adults who are at higher risk of having a heart attack or stroke and have an LDL cholesterol level of 70 to 189 mg/dL  · Adults who are 21 years old or older and have an LDL cholesterol level of 190 mg/dL or higher.  If you are in a high-risk group, talk with your healthcare provider about your treatment goals. Make sure you understand why these goals are important, based on your own health history and your family history of heart disease or high cholesterol.  Make a plan to have regular cholesterol checks. You may need to fast before getting this test. Also ask your provider about any side effects your medicines may cause. Let your provider know about any side effects you have. You may need to take more than one medicine to reach the cholesterol goals that you and your provider decide on.  Date Last Reviewed: 10/1/2016  © 0756-1827 [employer]. [address] [place] [id number]. All rights reserved. This information is not intended as a substitute for professional medical care. Always follow your healthcare professional's instructions.        Eating Heart-Healthy Foods  Eating has a big impact on your heart health. In fact, eating healthier can improve several of your heart risks at once. For instance, it helps you manage weight, cholesterol, and blood  pressure. Here are ideas to help you make heart-healthy changes without giving up all the foods and flavors you love.  Getting started  · Talk with your health care provider about eating plans, such as the DASH or Mediterranean diet. You may also be referred to a dietitian.  · Change a few things at a time. Give yourself time to get used to a few eating changes before adding more.  · Work to create a tasty, healthy eating plan that you can stick to for the rest of your life.    Goals for healthy eating  Below are some tips to improve your eating habits:  · Limit saturated fats and trans fats. Saturated fats raise your levels of cholesterol, so keep these fats to a minimum. They are found in foods such as fatty meats, whole milk, cheese, and palm and coconut oils. Avoid trans fats because they lower good cholesterol as well as raise bad cholesterol. Trans fats are most often found in processed foods.  · Reduce sodium (salt) intake. Eating too much salt may increase your blood pressure. Limit your sodium intake to 2,300 milligrams (mg) per day, or less if your health care provider recommends it. Dining out less often and eating fewer processed foods are two great ways to decrease the amount of salt you consume.  · Managing calories. A calorie is a unit of energy. Your body burns calories for fuel, but if you eat more calories than your body burns, the extras are stored as fat. Your health care provider can help you create a diet plan to manage your calories. This will likely include eating healthier foods as well as exercising regularly. To help you track your progress, keep a diary to record what you eat and how often you exercise.  Choose the right foods  Aim to make these foods staples of your diet. If you have diabetes, you may have different recommendations than what is listed here:  · Fruits and vegetable provide plenty of nutrients without a lot of calories. At meals, fill half your plate with these foods.  Split the other half of your plate between whole grains and lean protein.  · Whole grains are high in fiber and rich in vitamins and nutrients. Good choices include whole-wheat bread, pasta, and brown rice.  · Lean proteins give you nutrition with less fat. Good choices include fish, skinless chicken, and beans.  · Low-fat or nonfat dairy provides nutrients without a lot of fat. Try low-fat or nonfat milk, cheese, or yogurt.  · Healthy fats can be good for you in small amounts. These are unsaturated fats, such as olive oil, nuts, and fish. Try to have at least 2 servings per week of fatty fish such as salmon, sardines, mackerel, rainbow trout, and albacore tuna. These contain omega-3 fatty acids, which are good for your heart. Flaxseed is another source of a heart-healthy fat.  More on heart healthy eating    Read food labels  Healthy eating starts at the grocery store. Be sure to pay attention to food labels on packaged foods. Look for products that are high in fiber and protein, and low in saturated fat, cholesterol, and sodium. Avoid products that contain trans fat. And pay close attention to serving size. For instance, if you plan to eat two servings, double all the numbers on the label.  Prepare food right  A key part of healthy cooking is cutting down on added fat and salt. Look on the internet for lower-fat, lower-sodium recipes. Also, try these tips:  · Remove fat from meat and skin from poultry before cooking.  · Skim fat from the surface of soups and sauces.  · Broil, boil, bake, steam, grill, and microwave food without added fats.  · Choose ingredients that spice up your food without adding calories, fat, or sodium. Try these items: horseradish, hot sauce, lemon, mustard, nonfat salad dressings, and vinegar. For salt-free herbs and spices, try basil, cilantro, cinnamon, pepper, and rosemary.  Date Last Reviewed: 6/25/2015  © 7956-6308 [employer]. [address] [place] [address], [place] [address].  All rights reserved. This information is not intended as a substitute for professional medical care. Always follow your healthcare professional's instructions.

## 2019-04-09 ENCOUNTER — TELEPHONE (OUTPATIENT)
Dept: FAMILY MEDICINE | Facility: CLINIC | Age: 64
End: 2019-04-09

## 2019-04-09 NOTE — TELEPHONE ENCOUNTER
----- Message from JOYCE Meadows MD sent at 4/9/2019 10:19 AM CDT -----  Ms. Herr,   I saw a mutual patient that will need surgery. Ms. Haider was recently seen by you and I was hoping you could tell me if she is cleared for a trapeziectomy and suspension arthroplasty under general anesthesia. Thank you for the assistance.    Sincerely,  Carmine Meadows

## 2019-04-09 NOTE — TELEPHONE ENCOUNTER
Please call patient to see if she needs a surgical clearance from me.  If so, she needs an appointment for this clearance and current labs.

## 2019-04-18 ENCOUNTER — TELEPHONE (OUTPATIENT)
Dept: FAMILY MEDICINE | Facility: CLINIC | Age: 64
End: 2019-04-18

## 2019-04-18 NOTE — TELEPHONE ENCOUNTER
Placed call to pt advising her that an appt is needed for medical clearance. Pt refused to schedule an appt at this time.

## 2019-04-18 NOTE — TELEPHONE ENCOUNTER
After speaking with patient's surgeon, he does need surgical clearance from me.  I require an office visit and labs to clear her.  Patient needs an appointment for this clearance.

## 2019-05-06 ENCOUNTER — OFFICE VISIT (OUTPATIENT)
Dept: FAMILY MEDICINE | Facility: CLINIC | Age: 64
End: 2019-05-06
Payer: COMMERCIAL

## 2019-05-06 VITALS
HEIGHT: 63 IN | OXYGEN SATURATION: 100 % | SYSTOLIC BLOOD PRESSURE: 118 MMHG | HEART RATE: 66 BPM | BODY MASS INDEX: 27.53 KG/M2 | WEIGHT: 155.38 LBS | DIASTOLIC BLOOD PRESSURE: 86 MMHG

## 2019-05-06 DIAGNOSIS — M50.30 DDD (DEGENERATIVE DISC DISEASE), CERVICAL: ICD-10-CM

## 2019-05-06 DIAGNOSIS — I10 BENIGN HYPERTENSION: ICD-10-CM

## 2019-05-06 DIAGNOSIS — E78.2 MULTIPLE-TYPE HYPERLIPIDEMIA: ICD-10-CM

## 2019-05-06 DIAGNOSIS — E03.9 ACQUIRED HYPOTHYROIDISM: ICD-10-CM

## 2019-05-06 DIAGNOSIS — Z01.818 PREOPERATIVE CLEARANCE: Primary | ICD-10-CM

## 2019-05-06 DIAGNOSIS — E66.3 OVERWEIGHT (BMI 25.0-29.9): ICD-10-CM

## 2019-05-06 PROCEDURE — 3008F BODY MASS INDEX DOCD: CPT | Mod: ,,, | Performed by: NURSE PRACTITIONER

## 2019-05-06 PROCEDURE — 3008F PR BODY MASS INDEX (BMI) DOCUMENTED: ICD-10-PCS | Mod: ,,, | Performed by: NURSE PRACTITIONER

## 2019-05-06 PROCEDURE — 99214 PR OFFICE/OUTPT VISIT, EST, LEVL IV, 30-39 MIN: ICD-10-PCS | Mod: ,,, | Performed by: NURSE PRACTITIONER

## 2019-05-06 PROCEDURE — 3079F PR MOST RECENT DIASTOLIC BLOOD PRESSURE 80-89 MM HG: ICD-10-PCS | Mod: ,,, | Performed by: NURSE PRACTITIONER

## 2019-05-06 PROCEDURE — 3074F PR MOST RECENT SYSTOLIC BLOOD PRESSURE < 130 MM HG: ICD-10-PCS | Mod: ,,, | Performed by: NURSE PRACTITIONER

## 2019-05-06 PROCEDURE — 3074F SYST BP LT 130 MM HG: CPT | Mod: ,,, | Performed by: NURSE PRACTITIONER

## 2019-05-06 PROCEDURE — 99214 OFFICE O/P EST MOD 30 MIN: CPT | Mod: ,,, | Performed by: NURSE PRACTITIONER

## 2019-05-06 PROCEDURE — 3079F DIAST BP 80-89 MM HG: CPT | Mod: ,,, | Performed by: NURSE PRACTITIONER

## 2019-05-06 NOTE — PATIENT INSTRUCTIONS
Taking Your Blood Pressure  Blood pressure is the force of blood against the artery wall as it moves from the heart through the blood vessels. You can take your own blood pressure reading using a digital monitor. Take your readings the same each time, using the same arm. Take readings as often as your healthcare provider instructs.  About blood pressure monitors  Blood pressure monitors are designed for certain ages and cases. You can find monitors for older adults, for pregnant women, and for children. Make sure the one you choose is the right one for your age and situation.  The American Heart Association recommends an automatic cuff monitor that fits on your upper arm (bicep). The cuff should fit your arm size. A cuff thats too large or too small will not give an accurate reading. Measure around your upper arm to find your size.  Monitors that attach to your finger or wrist are not as accurate as monitors for your upper arm.  Ask your healthcare provider for help in choosing a monitor. Bring your monitor to your next provider visit if you need help in using it the correct way.  The steps below are general instructions for using an automatic digital monitor.  Step 1. Relax    · Take your blood pressure at the same time every day, such as in the morning or evening, or at the time your healthcare provider recommends.  · Wait at least a half-hour after smoking, eating, or exercising. Don't drink coffee, tea, soda, or other caffeinated beverages before checking your blood pressure.  · Sit comfortably at a table with both feet on the floor. Do not cross your legs or feet. Place the monitor near you.  · Rest for a few minutes before you begin.  Step 2. Wrap the cuff    · Place your arm on the table, palm up. Your arm should be at the level of your heart. Wrap the cuff around your upper arm, just above your elbow. Its best done on bare skin, not over clothing. Most cuffs will indicate where the brachial artery (the  blood vessel in the middle of the arm at the inner side of the elbow) should line up with the cuff. Look in your monitor's instruction booklet for an illustration. You can also bring your cuff to your healthcare provider and have them show you how to correctly place the cuff.  Step 3. Inflate the cuff    · Push the button that starts the pump.  · The cuff will tighten, then loosen.  · The numbers will change. When they stop changing, your blood pressure reading will appear.  · Take 2 or 3 readings one minute apart.  Step 4. Write down the results of each reading    · Write down your blood pressure numbers for each reading. Note the date and time. Keep your results in one place, such as a notebook. Even if your monitor has a built-in memory, keep a hard copy of the readings.  · Remove the cuff from your arm. Turn off the machine.  · Bring your blood pressure records with your healthcare providers at each visit.  · If you start a new blood pressure medicine, note the day you started the new medicine. Also note the day if you change the dose of your medicine. This information goes on your blood pressure recording sheet. This will help your healthcare provider monitor how well the medicine changes are working.  · Ask your healthcare provider what numbers should prompt you to call him or her. Also ask what numbers should prompt you to get help right away.  Date Last Reviewed: 11/1/2016 © 2000-2017 [employer]. [address] [place] [address]. All rights reserved. This information is not intended as a substitute for professional medical care. Always follow your healthcare professional's instructions.        Presurgery Checklist  You are scheduled to have surgery. The healthcare staff will try to make your stay comfortable. Use the guidelines below to remind yourself what to do before surgery. Be sure to follow any specific pre-op instructions from your surgeon or nurse.  Preparing for  surgery  · If you are having abdominal surgery, ask what you need to do to clear your bowel.  · Tell your surgeon if you have allergies to any medicines, latex, or foods.  · Ask your surgeon if you might need a blood transfusion during surgery and if so, how to prepare for it. In some cases, you can donate blood before surgery. If needed, this blood can be given back (transfused) to you during or after surgery.  · Arrange for an adult family member or friend to drive you home after surgery. If possible, have someone ready to help you at home as you recover.  · Call the surgeon if you get a cold, fever, sore throat, diarrhea, or other health problem just before surgery. Your surgeon can decide whether or not to postpone the surgery.  Medicines  · Tell your surgeon about all medicines you take, including prescription and over-the-counter products such as herbal remedies and vitamins. Ask if you should continue taking them.  · If you take ibuprofen, naproxen, or blood thinners (anticoagulants) such as aspirin, clopidogrel, or warfarin, ask your surgeon whether you should stop taking them and how long before surgery you should stop.  · You may be told to take antibiotics just before surgery to prevent infection. If so, follow instructions carefully on how to take them.  · If you are told to take blood thinners to help prevent blood clots after surgery, be sure to follow the instructions on how to take them.  Stop smoking  If you smoke, healing may take longer. So at least 2 week(s) before surgery, stop smoking.  Bathing or showering before surgery  · If instructed, wash with antibacterial soap. Afterward, do not use lotions, oils, or powders.  · If you are having surgery on the head, you may be asked to shampoo with antibacterial soap. Follow instructions for doing so.  Do not remove hair from the surgery site  Do not shave hair from the incision site, unless you are given specific instructions to do so. Usually, if  hair needs to be removed, it will be done at the hospital right before surgery.  Dont eat or drink  · Follow any directions you are given for not eating or drinking before surgery. If you don't follow instructions about when to stop eating and drinking, your procedure may be postponed or rescheduled for another day. This is a safety issue.  · You can brush your teeth and rinse your mouth, but dont swallow any water.  Day of surgery  · Don't wear makeup. Don't use perfume, deodorant, or hairspray. Remove nail polish and artificial nails.  · Leave jewelry (including rings), watches, and other valuables at home.  · Be sure to bring health insurance cards or forms and a photo ID.  · Bring a list of your medicines (include the name, dose, how often you take them, and the time last dose was taken).  · Arrive on time at the hospital or surgery facility.  Date Last Reviewed: 12/1/2016  © 1924-8368 [employer]. [address] [place] [address], [place] [address]. All rights reserved. This information is not intended as a substitute for professional medical care. Always follow your healthcare professional's instructions.

## 2019-05-06 NOTE — PROGRESS NOTES
Subjective:       Patient ID: Yudith Haider is a 63 y.o. female.    Chief Complaint: Pre-op Exam (thumb on rt. hand)    Patient presents with:  Pre-op Exam: thumb on rt. hand       Subjective:    Yudith Haider is a 63 y.o. female who presents to the office today for a preoperative consultation at the request of surgeon JOYCE Meadows MD who plans on performing trapeziectomy and suspension arthroplasty of the right side under general anesthesia on a date to be determined within the next month. This consultation is requested for the specific conditions prompting preoperative evaluation (i.e. because of potential affect on operative risk). Planned anesthesia: general. The patient has the following known anesthesia issues: none. Patients bleeding risk: no recent abnormal bleeding. Patient does not have objections to receiving blood products if needed.    Procedure Risk: intermediate  Recent Procedures: none  Recent infections: none    Heart Disease:  Angina history No  MI>6month No If yes then 5 points  MI<6 months No If yes then 10 points  Unstable Angina No If yes then 10 points  Class III No If yes then 10 points  Class IV No If yes then 20 points  Revasc <5years No and asymptomatic No  If symptomatic then needs restudy.  Pulmonary Edema history No If yes then 5 points  Pulmonary Edema <7days No If yes then 10 points  Critical Aortic Stenosis No If yes then 20 points  Abnormal EKG No  PACs No If yes 5 points        General poor health No If yes then 5 points  Age > 70 No If yes then 10 points  CVA No  HTN Yes  COPD No Consider PFTs prior to thoracotomy  Liver Failure/Alcohol withdrawal No  Anemia No    If any yes in categories greater than or equal to 10 points marked yes, procedure should be delayed, angiography and risk-factor modification should be considered. If the presence of other clinical predictors then stress test prior to procedure.   Points total 5   0-15 = low, 15-30 = intermediate, 31+ =  high    Cardiographics  ECG: no prior ECG  Echocardiogram: not done    Imaging  Chest x-ray: normal     Lab Review   If large blood loss possible during surgery then CBC pre-op  Office Visit on 04/04/2019  WBC                       Value: 7.4(Thousand/uL)   Dt: 05/06/2019  RBC                       Value: 4.04(Million/uL)   Dt: 05/06/2019  Hemoglobin                Value: 13.2(g/dL)         Dt: 05/06/2019  Hematocrit                Value: 39.3(%)            Dt: 05/06/2019  Mean Corpuscular Volume   Value: 97.3(fL)           Dt: 05/06/2019  Mean Corpuscular Hemoglo* Value: 32.7(pg)           Dt: 05/06/2019  Mean Corpuscular Hemoglo* Value: 33.6(g/dL)         Dt: 05/06/2019  RDW                       Value: 11.9(%)            Dt: 05/06/2019  Platelets                 Value: 286(Thousand/uL)   Dt: 05/06/2019  MPV                       Value: 10.3(fL)           Dt: 05/06/2019  Neutrophils Absolute      Value: 5,284(cells/uL)    Dt: 05/06/2019  Lymph #                   Value: 1,428(cells/uL)    Dt: 05/06/2019  Mono #                    Value: 562(cells/uL)      Dt: 05/06/2019  Eos #                     Value: 89(cells/uL)       Dt: 05/06/2019  Baso #                    Value: 37(cells/uL)       Dt: 05/06/2019  Neutrophils Relative      Value: 71.4(%)            Dt: 05/06/2019  Lymph%                    Value: 19.3(%)            Dt: 05/06/2019  Mono%                     Value: 7.6(%)             Dt: 05/06/2019  Eosinophil%               Value: 1.2(%)             Dt: 05/06/2019  Basophil%                 Value: 0.5(%)             Dt: 05/06/2019  Glucose                   Value: 93(mg/dL)          Dt: 05/06/2019  BUN, Bld                  Value: 9(mg/dL)           Dt: 05/06/2019  Creatinine                Value: 0.56(mg/dL)        Dt: 05/06/2019  eGFR if non  Amer* Value: 99(mL/min/1.73m2)  Dt: 05/06/2019  eGFR if   Value: 114(mL/min/1.73m2) Dt: 05/06/2019  BUN/Creatinine Ratio                                 Dt: 05/06/2019                  Value: NOT APPLICABLE   Sodium                    Value: 135(mmol/L)        Dt: 05/06/2019  Potassium                 Value: 4.4(mmol/L)        Dt: 05/06/2019  Chloride                  Value: 96(mmol/L)*        Dt: 05/06/2019  CO2                       Value: 28(mmol/L)         Dt: 05/06/2019  Calcium                   Value: 9.3(mg/dL)         Dt: 05/06/2019  Total Protein             Value: 7.4(g/dL)          Dt: 05/06/2019  Albumin                   Value: 4.5(g/dL)          Dt: 05/06/2019  Globulin, Total           Value: 2.9(g/dL (calc))   Dt: 05/06/2019  Albumin/Globulin Ratio    Value: 1.6((calc))        Dt: 05/06/2019  Total Bilirubin           Value: 0.5(mg/dL)         Dt: 05/06/2019  Alkaline Phosphatase      Value: 86(U/L)            Dt: 05/06/2019  AST                       Value: 17(U/L)            Dt: 05/06/2019  ALT                       Value: 16(U/L)            Dt: 05/06/2019  TSH                       Value: 0.97(mIU/L)        Dt: 05/06/2019  Cholesterol               Value: 202(mg/dL)*        Dt: 05/06/2019  HDL                       Value: 71(mg/dL)          Dt: 05/06/2019  Triglycerides             Value: 118(mg/dL)         Dt: 05/06/2019  LDL Cholesterol           Value: 108(mg/dL (calc))* Dt: 05/06/2019  Hdl/Cholesterol Ratio     Value: 2.8((calc))        Dt: 05/06/2019  Non HDL Chol. (LDL+VLDL)  Value: 131(mg/dL (calc))* Dt: 05/06/2019  ------------  Office Visit on 03/18/2019  CCP Antibodies IgG/IgA    Value: 5(units)           Dt: 03/18/2019  HLA B27 Interpretation    Value: Negative           Dt: 03/18/2019  Total Protein             Value: 7.3(g/dL)          Dt: 03/18/2019  Albumin                   Value: 4.0(g/dL)          Dt: 03/18/2019  Alpha 1 Globulin/Protein* Value: 0.3(g/dL)          Dt: 03/18/2019  Alpha 2 Globulin/Protein* Value: 0.9(g/dL)          Dt: 03/18/2019  Beta Globulin             Value: 1.2(g/dL)          Dt: 03/18/2019  Gamma  Globulin            Value: 0.9(g/dL)          Dt: 03/18/2019  Albumin/Globulin Ratio    Value: 1.2                Dt: 03/18/2019  M-Ilya, %                                          Dt: 03/18/2019                  Value: Not Observed   Globulin, Total           Value: 3.3(g/dL)          Dt: 03/18/2019  Please Note:              Value: Comment            Dt: 03/18/2019  ------------     Assessment:      63 y.o. female with planned surgery as above.    Known risk factors for perioperative complications: Hypertension, hypothyroid (both stable)    Difficulty with intubation is not anticipated.    Cardiac Risk Estimation: low     Plan:    1. Preoperative workup as follows chest x-ray, hemoglobin, hematocrit, electrolytes, creatinine, glucose, liver function studies.   2. Medications reviewed. Change in medication regimen before surgery: discontinue ASA 14 days before surgery and discontinue NSAIDs 14 days before surgery.   3. Prophylaxis for cardiac events with perioperative beta-blockers: should be considered, specific regimen per anesthesia. Caution in patients with COPD.   4. Invasive hemodynamic monitoring perioperatively: at the discretion of anesthesiologist.  5. Deep vein thrombosis prophylaxis postoperatively:regimen to be chosen by surgical team.  6. Other measures: Preoperative alcohol abstention x 30 days.  7. Discuss with team  8. No follow-ups on file.      As with all procedures, there is inherent risk of multiple complications including bleeding,  infectious, cardiac, and pulmonary.  Please stop Aspirin and NSAIDS one week prior to surgery.    All smokers should quit smoking eight or more weeks prior to procedure to minimize complications associated with smoking. Reduction or cessation of smoking for less than four to eight weeks before surgery is of questionable benefit, and has actually been shown in some studies to result in higher complication rates.  Alcohol should be used in moderation.  Any pt with  cardiopulmonary disease may warrant repeat examination prior to procedure along with directions for deep breathing exercises and incentive spirometry.    Patients at high risk for complications usually warrant cardiology consultation and possibly angiography. Cardiac stress testing should be performed in patients at intermediate risk and with poor functional capacity or who are undergoing high-risk procedures, such as vascular surgery. For patients with minor clinical predictors, only patients who have poor functional capacity and are undergoing a high-risk procedure require stress testing. Patients with positive stress test results warrant cardiology consultation before proceeding with surgery.  Predisposing risk factors for pulmonary complications include cough, dyspnea, smoking, a history of lung disease, obesity and abdominal or thoracic surgery.    Any pts > 70 years old may be at risk for delirium or cardiac complications.  Furthermore, diabetic patients may be at risk for infection or prolonged healing.      Tessie Leon  05/06/2019 8:05 AM    Review of Systems   Constitutional: Negative for activity change, appetite change and fever.        Overweight   HENT: Negative for congestion, ear discharge, ear pain, sore throat, trouble swallowing and voice change.    Eyes: Negative for photophobia, pain, discharge and visual disturbance.   Respiratory: Negative for cough, chest tightness and shortness of breath.    Cardiovascular: Negative for chest pain and palpitations.        Hypertension   Gastrointestinal: Negative for abdominal pain, nausea and vomiting.   Endocrine: Negative for cold intolerance and heat intolerance.        Hypothyroid   Genitourinary: Negative for difficulty urinating and dysuria.   Musculoskeletal: Negative for arthralgias and gait problem.   Skin: Negative for rash.   Allergic/Immunologic: Negative for immunocompromised state.   Neurological: Negative for speech difficulty and  headaches.   Psychiatric/Behavioral: Negative for confusion, self-injury and suicidal ideas.       Past Medical History:   Diagnosis Date    History of pneumonia     Hyperlipidemia     Hypertension     Hypothyroidism       Past Surgical History:   Procedure Laterality Date    BIOPSY, MASS, UPPER EXTREMITY/ dorsal wrist Left 2018    Performed by [facility] at [facility]    CARPAL TUNNEL RELEASE Bilateral     HAND SURGERY Right 2018    removal of for. object    HYSTERECTOMY  1981    INJECTION-STEROID-EPIDURAL-CERVICAL N/A 2017    [facility]    INJECTION-STEROID-EPIDURAL-CERVICAL N/A 2017    [facility] at [facility]    THYROIDECTOMY         Family History   Problem Relation Age of Onset    Hypertension Mother     Diabetes Mother     Stroke Father     Hypertension Father        Social History     Socioeconomic History    Marital status:      Spouse name: Not on file    Number of children: Not on file    Years of education: Not on file    Highest education level: Not on file   Occupational History    Not on file   Social Needs    Financial resource strain: Not on file    Food insecurity:     Worry: Not on file     Inability: Not on file    Transportation needs:     Medical: Not on file     Non-medical: Not on file   Tobacco Use    Smoking status: Former Smoker     Types: Cigarettes     Last attempt to quit: 2003     Years since quittin.3    Smokeless tobacco: Never Used   Substance and Sexual Activity    Alcohol use: Yes     Comment: occassional    Drug use: No    Sexual activity: Not on file   Lifestyle    Physical activity:     Days per week: Not on file     Minutes per session: Not on file    Stress: Not on file   Relationships    Social connections:     Talks on phone: Not on file     Gets together: Not on file     Attends [employer] service: Not on file     Active member of club or organization:  "Not on file     Attends meetings of clubs or organizations: Not on file     Relationship status: Not on file   Other Topics Concern    Not on file   Social History Narrative    Not on file       Current Outpatient Medications   Medication Sig Dispense Refill    acetaminophen (TYLENOL ARTHRITIS ORAL) Take 3 tablets by mouth as needed.       amLODIPine (NORVASC) 2.5 MG tablet Take 1 tablet (2.5 mg total) by mouth Daily. 90 tablet 1    bisoprolol (ZEBETA) 10 MG tablet Take 1 tablet (10 mg total) by mouth once daily. 90 tablet 1    levothyroxine (SYNTHROID) 112 MCG tablet Take 1 tablet (112 mcg total) by mouth once daily. 90 tablet 1    UBIDECARENONE (COENZYME Q10) 60 mg Cap Take 1 capsule by mouth Daily.       No current facility-administered medications for this visit.      Facility-Administered Medications Ordered in Other Visits   Medication Dose Route Frequency Provider Last Rate Last Dose    lactated ringers infusion   Intravenous Continuous Blake Quach MD   Stopped at 12/26/18 0915       Review of patient's allergies indicates:   Allergen Reactions    Lisinopril      Other reaction(s): Cough     Objective:      Blood pressure 118/86, pulse 66, height 5' 3" (1.6 m), weight 70.5 kg (155 lb 6.4 oz), SpO2 100 %. Body mass index is 27.53 kg/m².   Physical Exam   Constitutional: She is oriented to person, place, and time. She appears well-developed and well-nourished. She is cooperative. No distress.   HENT:   Head: Normocephalic and atraumatic.   Right Ear: Tympanic membrane and external ear normal.   Left Ear: Tympanic membrane and external ear normal.   Nose: Nose normal.   Mouth/Throat: Oropharynx is clear and moist.   Eyes: Pupils are equal, round, and reactive to light. Conjunctivae, EOM and lids are normal. Lids are everted and swept, no foreign bodies found. Right pupil is round and reactive. Left pupil is round and reactive.   Neck: Trachea normal and normal range of motion. Neck supple. "   Cardiovascular: Normal rate, regular rhythm, S1 normal, S2 normal, normal heart sounds and intact distal pulses.   No murmur heard.  Pulmonary/Chest: Effort normal and breath sounds normal. No respiratory distress. She has no wheezes. She exhibits no tenderness.   Abdominal: Soft. Bowel sounds are normal. There is no rigidity and no guarding.   Musculoskeletal: Normal range of motion.   Lymphadenopathy:     She has no cervical adenopathy.     She has no axillary adenopathy.   Neurological: She is alert and oriented to person, place, and time.   Skin: Skin is warm and dry. Capillary refill takes less than 2 seconds.   Psychiatric: She has a normal mood and affect. Her behavior is normal. Judgment and thought content normal.   Nursing note and vitals reviewed.          Assessment:       1. Preoperative clearance    2. Multiple-type hyperlipidemia    3. Benign hypertension    4. Acquired hypothyroidism    5. DDD (degenerative disc disease), cervical    6. Overweight (BMI 25.0-29.9)        Plan:       Yudith was seen today for pre-op exam.    Diagnoses and all orders for this visit:    Preoperative clearance  -Patient is cleared for surgery.    Multiple-type hyperlipidemia  -Limit red meat, butter, fried foods, cheese, and other foods that have a lot of saturated fat. Consume more: lean meats, fish, fruits, vegetables, whole grains, beans, lentils, and nuts.  Weight loss, and 30-45 min of cardiovascular exercise daily.   -Stable.    Benign hypertension  -Lifestyle changes: Reduce the amount of salt in your diet; Lose weight; Avoid drinking too much alcohol; Exercise at least 30 minutes per day most days of the week.  Continue current medications and home BP monitoring.   -Stable.  Continue current medication.    Acquired hypothyroidism  -Stable. Continue current medication.    DDD (degenerative disc disease), cervical  -Stable. Continue current medication.    Overweight (BMI 25.0-29.9)  -The patient is asked to make  an attempt to improve diet and exercise patterns to aid in medical management of this problem.    Other orders  -     Cancel: CBC auto differential; Future  -     Cancel: Comprehensive metabolic panel; Future  -     Cancel: TSH; Future       Labs reviewed and are within acceptable range.  Patient is cleared for surgery.

## 2019-05-07 ENCOUNTER — TELEPHONE (OUTPATIENT)
Dept: FAMILY MEDICINE | Facility: CLINIC | Age: 64
End: 2019-05-07

## 2019-05-07 LAB
ALBUMIN SERPL-MCNC: 4.5 G/DL (ref 3.6–5.1)
ALBUMIN/GLOB SERPL: 1.6 (CALC) (ref 1–2.5)
ALP SERPL-CCNC: 86 U/L (ref 33–130)
ALT SERPL-CCNC: 16 U/L (ref 6–29)
AST SERPL-CCNC: 17 U/L (ref 10–35)
BASOPHILS # BLD AUTO: 37 CELLS/UL (ref 0–200)
BASOPHILS NFR BLD AUTO: 0.5 %
BILIRUB SERPL-MCNC: 0.5 MG/DL (ref 0.2–1.2)
BUN SERPL-MCNC: 9 MG/DL (ref 7–25)
BUN/CREAT SERPL: ABNORMAL (CALC) (ref 6–22)
CALCIUM SERPL-MCNC: 9.3 MG/DL (ref 8.6–10.4)
CHLORIDE SERPL-SCNC: 96 MMOL/L (ref 98–110)
CHOLEST SERPL-MCNC: 202 MG/DL
CHOLEST/HDLC SERPL: 2.8 (CALC)
CO2 SERPL-SCNC: 28 MMOL/L (ref 20–32)
CREAT SERPL-MCNC: 0.56 MG/DL (ref 0.5–0.99)
EOSINOPHIL # BLD AUTO: 89 CELLS/UL (ref 15–500)
EOSINOPHIL NFR BLD AUTO: 1.2 %
ERYTHROCYTE [DISTWIDTH] IN BLOOD BY AUTOMATED COUNT: 11.9 % (ref 11–15)
GFRSERPLBLD MDRD-ARVRAT: 99 ML/MIN/1.73M2
GLOBULIN SER CALC-MCNC: 2.9 G/DL (CALC) (ref 1.9–3.7)
GLUCOSE SERPL-MCNC: 93 MG/DL (ref 65–99)
HCT VFR BLD AUTO: 39.3 % (ref 35–45)
HDLC SERPL-MCNC: 71 MG/DL
HGB BLD-MCNC: 13.2 G/DL (ref 11.7–15.5)
LDLC SERPL CALC-MCNC: 108 MG/DL (CALC)
LYMPHOCYTES # BLD AUTO: 1428 CELLS/UL (ref 850–3900)
LYMPHOCYTES NFR BLD AUTO: 19.3 %
MCH RBC QN AUTO: 32.7 PG (ref 27–33)
MCHC RBC AUTO-ENTMCNC: 33.6 G/DL (ref 32–36)
MCV RBC AUTO: 97.3 FL (ref 80–100)
MONOCYTES # BLD AUTO: 562 CELLS/UL (ref 200–950)
MONOCYTES NFR BLD AUTO: 7.6 %
NEUTROPHILS # BLD AUTO: 5284 CELLS/UL (ref 1500–7800)
NEUTROPHILS NFR BLD AUTO: 71.4 %
NONHDLC SERPL-MCNC: 131 MG/DL (CALC)
PLATELET # BLD AUTO: 286 THOUSAND/UL (ref 140–400)
PMV BLD REES-ECKER: 10.3 FL (ref 7.5–12.5)
POTASSIUM SERPL-SCNC: 4.4 MMOL/L (ref 3.5–5.3)
PROT SERPL-MCNC: 7.4 G/DL (ref 6.1–8.1)
RBC # BLD AUTO: 4.04 MILLION/UL (ref 3.8–5.1)
SODIUM SERPL-SCNC: 135 MMOL/L (ref 135–146)
TRIGL SERPL-MCNC: 118 MG/DL
TSH SERPL-ACNC: 0.97 MIU/L (ref 0.4–4.5)
WBC # BLD AUTO: 7.4 THOUSAND/UL (ref 3.8–10.8)

## 2019-05-07 NOTE — TELEPHONE ENCOUNTER
----- Message from JOYCE Meadows MD sent at 4/9/2019  2:21 PM CDT -----  Thank you!  I greatly appreciate your assistance. If you don't mind could you send a message once she is cleared?    Sincerely,  Carmine Meadows    ----- Message -----  From: HIRO Castro  Sent: 4/9/2019  11:38 AM  To: JOYCE Meadows MD, [facility] Staff    Dr. Meadows,    I have seen Ms. Haider recently, but she did not mention this surgery.  I will have my staff reach out to her for an appointment to address this as she will needs at least some current lab values before she is cleared.      Sincerely,   KIARRA Castro FNP-C    ----- Message -----  From: JOYCE Meadows MD  Sent: 4/9/2019  10:19 AM  To: HIRO Castro MsNorman Herr,   I saw a mutual patient that will need surgery. Ms. Haider was recently seen by you and I was hoping you could tell me if she is cleared for a trapeziectomy and suspension arthroplasty under general anesthesia. Thank you for the assistance.    Sincerely,  Carmine Meadows

## 2019-05-08 LAB
APPEARANCE UR: CLEAR
BILIRUB UR QL STRIP: NEGATIVE
COLOR UR: YELLOW
GLUCOSE UR QL STRIP: NEGATIVE
HGB UR QL STRIP: NEGATIVE
KETONES UR QL STRIP: NEGATIVE
LEUKOCYTE ESTERASE UR QL STRIP: NEGATIVE
NITRITE UR QL STRIP: NEGATIVE
PH UR STRIP: 6 [PH] (ref 5–8)
PROT UR QL STRIP: NEGATIVE
SP GR UR STRIP: 1.01 (ref 1–1.03)

## 2019-05-23 PROBLEM — M19.041 OSTEOARTHRITIS OF RIGHT HAND: Status: ACTIVE | Noted: 2019-05-23

## 2019-06-11 PROBLEM — M25.60 STIFFNESS IN JOINT: Status: ACTIVE | Noted: 2019-06-11

## 2019-06-11 PROBLEM — M25.531 PAIN AND SWELLING OF RIGHT WRIST: Status: ACTIVE | Noted: 2019-06-11

## 2019-06-11 PROBLEM — M25.431 PAIN AND SWELLING OF RIGHT WRIST: Status: ACTIVE | Noted: 2019-06-11

## 2019-08-21 PROBLEM — M19.041 OSTEOARTHRITIS OF RIGHT HAND: Status: RESOLVED | Noted: 2019-05-23 | Resolved: 2019-08-21

## 2019-08-21 PROBLEM — M25.531 PAIN AND SWELLING OF RIGHT WRIST: Status: RESOLVED | Noted: 2019-06-11 | Resolved: 2019-08-21

## 2019-08-21 PROBLEM — M25.431 PAIN AND SWELLING OF RIGHT WRIST: Status: RESOLVED | Noted: 2019-06-11 | Resolved: 2019-08-21

## 2019-08-21 PROBLEM — M25.60 STIFFNESS IN JOINT: Status: RESOLVED | Noted: 2019-06-11 | Resolved: 2019-08-21

## 2019-08-26 PROBLEM — M19.042 OSTEOARTHRITIS OF LEFT HAND: Status: ACTIVE | Noted: 2019-08-26

## 2019-08-26 PROBLEM — M65.9 SYNOVITIS: Status: ACTIVE | Noted: 2019-08-26

## 2019-08-26 PROBLEM — M65.90 SYNOVITIS: Status: ACTIVE | Noted: 2019-08-26

## 2019-12-05 ENCOUNTER — TELEPHONE (OUTPATIENT)
Dept: RHEUMATOLOGY | Facility: CLINIC | Age: 64
End: 2019-12-05

## 2019-12-10 ENCOUNTER — TELEPHONE (OUTPATIENT)
Dept: RHEUMATOLOGY | Facility: CLINIC | Age: 64
End: 2019-12-10

## 2019-12-10 NOTE — TELEPHONE ENCOUNTER
Spoke with patient about rescheduling her appointment, she said her doctor wants her to see Dr Rios instead. So I rescheduled her appointment for the first available

## 2020-07-07 ENCOUNTER — TELEPHONE (OUTPATIENT)
Dept: RHEUMATOLOGY | Facility: CLINIC | Age: 65
End: 2020-07-07

## 2020-07-10 ENCOUNTER — TELEPHONE (OUTPATIENT)
Dept: RHEUMATOLOGY | Facility: CLINIC | Age: 65
End: 2020-07-10

## 2020-09-29 ENCOUNTER — OFFICE VISIT (OUTPATIENT)
Dept: RHEUMATOLOGY | Facility: CLINIC | Age: 65
End: 2020-09-29
Payer: MEDICARE

## 2020-09-29 VITALS
WEIGHT: 150.81 LBS | HEART RATE: 61 BPM | DIASTOLIC BLOOD PRESSURE: 87 MMHG | SYSTOLIC BLOOD PRESSURE: 158 MMHG | HEIGHT: 63 IN | BODY MASS INDEX: 26.72 KG/M2

## 2020-09-29 DIAGNOSIS — M19.90 INFLAMMATORY ARTHRITIS: ICD-10-CM

## 2020-09-29 DIAGNOSIS — M65.9 SYNOVITIS: Primary | ICD-10-CM

## 2020-09-29 DIAGNOSIS — M25.50 POLYARTHRALGIA: Primary | ICD-10-CM

## 2020-09-29 PROCEDURE — 1159F MED LIST DOCD IN RCRD: CPT | Mod: S$GLB,,, | Performed by: INTERNAL MEDICINE

## 2020-09-29 PROCEDURE — 1126F PR PAIN SEVERITY QUANTIFIED, NO PAIN PRESENT: ICD-10-PCS | Mod: S$GLB,,, | Performed by: INTERNAL MEDICINE

## 2020-09-29 PROCEDURE — 99999 PR PBB SHADOW E&M-EST. PATIENT-LVL III: CPT | Mod: PBBFAC,,, | Performed by: INTERNAL MEDICINE

## 2020-09-29 PROCEDURE — 3008F PR BODY MASS INDEX (BMI) DOCUMENTED: ICD-10-PCS | Mod: CPTII,S$GLB,, | Performed by: INTERNAL MEDICINE

## 2020-09-29 PROCEDURE — 3077F PR MOST RECENT SYSTOLIC BLOOD PRESSURE >= 140 MM HG: ICD-10-PCS | Mod: CPTII,S$GLB,, | Performed by: INTERNAL MEDICINE

## 2020-09-29 PROCEDURE — 3008F BODY MASS INDEX DOCD: CPT | Mod: CPTII,S$GLB,, | Performed by: INTERNAL MEDICINE

## 2020-09-29 PROCEDURE — 1126F AMNT PAIN NOTED NONE PRSNT: CPT | Mod: S$GLB,,, | Performed by: INTERNAL MEDICINE

## 2020-09-29 PROCEDURE — 3079F DIAST BP 80-89 MM HG: CPT | Mod: CPTII,S$GLB,, | Performed by: INTERNAL MEDICINE

## 2020-09-29 PROCEDURE — 1159F PR MEDICATION LIST DOCUMENTED IN MEDICAL RECORD: ICD-10-PCS | Mod: S$GLB,,, | Performed by: INTERNAL MEDICINE

## 2020-09-29 PROCEDURE — 99999 PR PBB SHADOW E&M-EST. PATIENT-LVL III: ICD-10-PCS | Mod: PBBFAC,,, | Performed by: INTERNAL MEDICINE

## 2020-09-29 PROCEDURE — 99205 OFFICE O/P NEW HI 60 MIN: CPT | Mod: S$GLB,,, | Performed by: INTERNAL MEDICINE

## 2020-09-29 PROCEDURE — 3077F SYST BP >= 140 MM HG: CPT | Mod: CPTII,S$GLB,, | Performed by: INTERNAL MEDICINE

## 2020-09-29 PROCEDURE — 3079F PR MOST RECENT DIASTOLIC BLOOD PRESSURE 80-89 MM HG: ICD-10-PCS | Mod: CPTII,S$GLB,, | Performed by: INTERNAL MEDICINE

## 2020-09-29 PROCEDURE — 99205 PR OFFICE/OUTPT VISIT, NEW, LEVL V, 60-74 MIN: ICD-10-PCS | Mod: S$GLB,,, | Performed by: INTERNAL MEDICINE

## 2020-09-29 NOTE — PROGRESS NOTES
Subjective:       Patient ID: Yudith Haider is a 65 y.o. female.    Chief Complaint: Disease Management    HPI 65 year old F with PMH of  HTN,  Thyroid nodule s/p thyroidectomy,chronic inflammatory arthritis ( previously followed by [facility]) here for evaluation.  She started to have joint pain 2 years ago. It started with one hand and then it went to there other one. At some point, she had trouble using her hands due to her pain.  Does not recall swelling. She had surgery in right cmc region and had no improvement. She has multiple surgeries in both hands. Per patient, she was noted to have synovitis during her surgery.  She has had 2 surgeries in each hand. She saw Dr. Rodriguez and tried plaquenil and did not like taking plaquenil.  She has pain in right second mcp region. On occasion, she has cmc region. Denies trouble making a fist. She feels like she doesn't have a strength that she used to.  Denies stiffness in her joints.   Cooler weather makes her pain worse.   Denies any rashes, oral ulcers, hair loss, photosensivitity,or pleurisy.  She was a smoker and quit 20 years ago.      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   HENT: Negative for congestion, ear discharge, ear pain, facial swelling, mouth sores, sinus pressure, sneezing, sore throat, tinnitus and trouble swallowing.    Eyes: Negative for photophobia, pain, discharge, redness, itching and visual disturbance.   Respiratory: Negative for apnea, chest tightness, shortness of breath,  "wheezing and stridor.    Cardiovascular: Negative for leg swelling.   Gastrointestinal: Negative for abdominal distention, abdominal pain, anal bleeding, blood in stool, constipation, diarrhea and nausea.   Endocrine: Negative for cold intolerance and heat intolerance.   Genitourinary: Negative for difficulty urinating and dysuria.   Musculoskeletal: Positive for arthralgias, back pain, gait problem and joint swelling. Negative for neck pain and neck stiffness.   Skin: Negative for color change, pallor, rash and wound.   Neurological: Negative for dizziness, seizures, light-headedness and numbness.   Hematological: Negative for adenopathy. Does not bruise/bleed easily.   Psychiatric/Behavioral: Negative for sleep disturbance. The patient is not nervous/anxious.          Objective:   BP (!) 158/87   Pulse 61   Ht 5' 3" (1.6 m)   Wt 68.4 kg (150 lb 12.7 oz)   BMI 26.71 kg/m²      Physical Exam   Constitutional: She is oriented to person, place, and time.   HENT:   Head: Normocephalic and atraumatic.   Right Ear: External ear normal.   Left Ear: External ear normal.   Nose: Nose normal.   Mouth/Throat: Oropharynx is clear and moist. No oropharyngeal exudate.   Eyes: Conjunctivae and EOM are normal. Pupils are equal, round, and reactive to light. Right eye exhibits no discharge. Left eye exhibits no discharge. No scleral icterus.   Neck: Neck supple. No JVD present. No thyromegaly present.   Cardiovascular: Normal rate, regular rhythm, normal heart sounds and intact distal pulses.  Exam reveals no gallop and no friction rub.    No murmur heard.  Pulmonary/Chest: Effort normal and breath sounds normal. No respiratory distress. She has no wheezes. She has no rales. She exhibits no tenderness.   Abdominal: Soft. Bowel sounds are normal. She exhibits no distension and no mass. There is no abdominal tenderness. There is no rebound and no guarding.   Lymphadenopathy:     She has no cervical adenopathy.   Neurological: She " is alert and oriented to person, place, and time. No cranial nerve deficit. Gait normal. Coordination normal.   Skin: Skin is dry. No rash noted. No erythema. No pallor.     Psychiatric: Affect and judgment normal.   Musculoskeletal: No tenderness, deformity or edema.            No data to display       Labs: reviewed    8/2019: pathology:1. LEFT TRAPEZIUM, EXCISION:   - DEGENERATIVE JOINT DISEASE AND CHRONIC LYMPHOPLASMACYTIC SYNOVITIS.   - SEE COMMENT     2. LEFT ULNA WRIST SYNOVIUM, EXCISION:   - ACUTE AND CHRONIC LYMPHOPLASMACYTIC SYNOVITIS.   - SEE COMMENT     Assessment:     65 year old F with PMH of  HTN,  Thyroid nodule s/p thyroidectomy,chronic inflammatory arthritis ( previously followed by [facility]) here for evaluation.  She has history of multiple hands surgeries with left ulnar wrist synovium biopsy consistent with synovitis.  I had long discussion with patient and told her that I do think she has RA.  Patient hesitant to get started on medications.  No diagnosis found.        Plan:       Problem List Items Addressed This Visit     None        Labs  xrays  Will bring her back to discuss results  One hour of face to face time spent with patient  *

## 2020-10-05 ENCOUNTER — HOSPITAL ENCOUNTER (OUTPATIENT)
Dept: RADIOLOGY | Facility: HOSPITAL | Age: 65
Discharge: HOME | End: 2020-10-05
Attending: INTERNAL MEDICINE
Payer: MEDICARE

## 2020-10-05 DIAGNOSIS — M25.50 POLYARTHRALGIA: ICD-10-CM

## 2020-10-05 PROCEDURE — 73130 XR HAND COMPLETE 3 VIEWS BILATERAL: ICD-10-PCS | Mod: 26,50,, | Performed by: RADIOLOGY

## 2020-10-05 PROCEDURE — 73110 X-RAY EXAM OF WRIST: CPT | Mod: TC,50,FY,PO

## 2020-10-05 PROCEDURE — 73130 X-RAY EXAM OF HAND: CPT | Mod: 26,50,, | Performed by: RADIOLOGY

## 2020-10-05 PROCEDURE — 73110 XR WRIST COMPLETE 3 VIEWS BILATERAL: ICD-10-PCS | Mod: 26,50,, | Performed by: RADIOLOGY

## 2020-10-05 PROCEDURE — 73130 X-RAY EXAM OF HAND: CPT | Mod: TC,50,FY,PO

## 2020-10-05 PROCEDURE — 73110 X-RAY EXAM OF WRIST: CPT | Mod: 26,50,, | Performed by: RADIOLOGY

## 2020-11-09 ENCOUNTER — PATIENT MESSAGE (OUTPATIENT)
Dept: RHEUMATOLOGY | Facility: CLINIC | Age: 65
End: 2020-11-09

## 2021-02-23 ENCOUNTER — PATIENT MESSAGE (OUTPATIENT)
Dept: RHEUMATOLOGY | Facility: CLINIC | Age: 66
End: 2021-02-23

## 2021-04-29 ENCOUNTER — PATIENT MESSAGE (OUTPATIENT)
Dept: RESEARCH | Facility: HOSPITAL | Age: 66
End: 2021-04-29

## 2021-05-10 PROBLEM — R07.2 PRECORDIAL PAIN: Status: ACTIVE | Noted: 2021-05-10

## 2021-10-06 ENCOUNTER — IMMUNIZATION (OUTPATIENT)
Dept: PHARMACY | Facility: CLINIC | Age: 66
End: 2021-10-06
Payer: MEDICARE

## 2022-03-10 ENCOUNTER — PATIENT MESSAGE (OUTPATIENT)
Dept: RHEUMATOLOGY | Facility: CLINIC | Age: 67
End: 2022-03-10
Payer: MEDICARE

## 2022-10-06 ENCOUNTER — OFFICE VISIT (OUTPATIENT)
Dept: OPTOMETRY | Facility: CLINIC | Age: 67
End: 2022-10-06
Payer: COMMERCIAL

## 2022-10-06 DIAGNOSIS — Z01.00 EXAMINATION OF EYES AND VISION: Primary | ICD-10-CM

## 2022-10-06 DIAGNOSIS — H04.123 DRY EYE SYNDROME, BILATERAL: ICD-10-CM

## 2022-10-06 DIAGNOSIS — H52.7 REFRACTIVE ERROR: ICD-10-CM

## 2022-10-06 DIAGNOSIS — H26.492 LEFT POSTERIOR CAPSULAR OPACIFICATION: ICD-10-CM

## 2022-10-06 DIAGNOSIS — Z96.1 PSEUDOPHAKIA: ICD-10-CM

## 2022-10-06 PROCEDURE — 92004 COMPRE OPH EXAM NEW PT 1/>: CPT | Mod: S$GLB,,, | Performed by: OPTOMETRIST

## 2022-10-06 PROCEDURE — 92004 PR EYE EXAM, NEW PATIENT,COMPREHESV: ICD-10-PCS | Mod: S$GLB,,, | Performed by: OPTOMETRIST

## 2022-10-06 PROCEDURE — 92015 PR REFRACTION: ICD-10-PCS | Mod: S$GLB,,, | Performed by: OPTOMETRIST

## 2022-10-06 PROCEDURE — 99999 PR PBB SHADOW E&M-EST. PATIENT-LVL III: ICD-10-PCS | Mod: PBBFAC,,, | Performed by: OPTOMETRIST

## 2022-10-06 PROCEDURE — 99999 PR PBB SHADOW E&M-EST. PATIENT-LVL III: CPT | Mod: PBBFAC,,, | Performed by: OPTOMETRIST

## 2022-10-06 PROCEDURE — 92015 DETERMINE REFRACTIVE STATE: CPT | Mod: S$GLB,,, | Performed by: OPTOMETRIST

## 2022-10-06 RX ORDER — HYDROXYZINE HYDROCHLORIDE 25 MG/1
25 TABLET, FILM COATED ORAL NIGHTLY
COMMUNITY
Start: 2022-08-23 | End: 2023-05-09

## 2022-10-06 RX ORDER — CETIRIZINE HYDROCHLORIDE 10 MG/1
10 TABLET ORAL EVERY MORNING
COMMUNITY
Start: 2022-08-23 | End: 2023-05-09

## 2022-10-06 NOTE — PROGRESS NOTES
"HPI    Pt here today for annual exam.    States blurred vision at near only,   distance vision good since cataract surgery OU 2014.       Happy with OTC readers.    Feels like film over OS, "aggravating". - always there, bothersome more   when driving to work at 530 am    Denies any headaches or eye pain.    (+) dry eyes, redness --- ATS OU prn  (-) allergies   (+) floaters OU --- no light flashes    Last edited by Emanuel Dhillon, OD on 10/6/2022  8:26 AM.            Assessment /Plan     For exam results, see Encounter Report.    Examination of eyes and vision    Pseudophakia    Left posterior capsular opacification    Refractive error    Dry eye syndrome, bilateral      1. Examination of eyes and vision    2. Pseudophakia  3. Left posterior capsular opacification  Superior PCO, not centrally dense    4. Refractive error  Dispensed updated spectacle Rx. Discussed various spectacle lens options - recommend svl distance for driving. Discussed adaptation period to new specs.   Demonstrated new spec Rx vs uncorrected vision in phoropter with patient satisfaction    5. Dry eye syndrome, bilateral  Discussed ocular affects of dry eyes. Recommend OTC ivizia artificial tears 2-4 times a day in both eyes. Discussed chronicity of MAXI. RTC if symptoms not alleviated by continued use of artificial tears.       RTC in 1 year for comprehensive eye exam, or sooner prn.                  "

## 2023-05-09 PROBLEM — L28.1 PRURIGO NODULARIS: Status: ACTIVE | Noted: 2023-05-09

## 2023-12-05 ENCOUNTER — TELEPHONE (OUTPATIENT)
Dept: PODIATRY | Facility: CLINIC | Age: 68
End: 2023-12-05
Payer: MEDICARE

## 2023-12-05 ENCOUNTER — OFFICE VISIT (OUTPATIENT)
Dept: PODIATRY | Facility: CLINIC | Age: 68
End: 2023-12-05
Payer: MEDICARE

## 2023-12-05 VITALS — WEIGHT: 150 LBS | HEIGHT: 63 IN | BODY MASS INDEX: 26.58 KG/M2

## 2023-12-05 DIAGNOSIS — S92.514A CLOSED NONDISPLACED FRACTURE OF PROXIMAL PHALANX OF LESSER TOE OF RIGHT FOOT, INITIAL ENCOUNTER: Primary | ICD-10-CM

## 2023-12-05 PROCEDURE — 3044F PR MOST RECENT HEMOGLOBIN A1C LEVEL <7.0%: ICD-10-PCS | Mod: CPTII,S$GLB,, | Performed by: STUDENT IN AN ORGANIZED HEALTH CARE EDUCATION/TRAINING PROGRAM

## 2023-12-05 PROCEDURE — 1160F PR REVIEW ALL MEDS BY PRESCRIBER/CLIN PHARMACIST DOCUMENTED: ICD-10-PCS | Mod: CPTII,S$GLB,, | Performed by: STUDENT IN AN ORGANIZED HEALTH CARE EDUCATION/TRAINING PROGRAM

## 2023-12-05 PROCEDURE — 3008F PR BODY MASS INDEX (BMI) DOCUMENTED: ICD-10-PCS | Mod: CPTII,S$GLB,, | Performed by: STUDENT IN AN ORGANIZED HEALTH CARE EDUCATION/TRAINING PROGRAM

## 2023-12-05 PROCEDURE — 3044F HG A1C LEVEL LT 7.0%: CPT | Mod: CPTII,S$GLB,, | Performed by: STUDENT IN AN ORGANIZED HEALTH CARE EDUCATION/TRAINING PROGRAM

## 2023-12-05 PROCEDURE — 99999 PR PBB SHADOW E&M-EST. PATIENT-LVL II: CPT | Mod: PBBFAC,,, | Performed by: STUDENT IN AN ORGANIZED HEALTH CARE EDUCATION/TRAINING PROGRAM

## 2023-12-05 PROCEDURE — 1101F PT FALLS ASSESS-DOCD LE1/YR: CPT | Mod: CPTII,S$GLB,, | Performed by: STUDENT IN AN ORGANIZED HEALTH CARE EDUCATION/TRAINING PROGRAM

## 2023-12-05 PROCEDURE — 3288F FALL RISK ASSESSMENT DOCD: CPT | Mod: CPTII,S$GLB,, | Performed by: STUDENT IN AN ORGANIZED HEALTH CARE EDUCATION/TRAINING PROGRAM

## 2023-12-05 PROCEDURE — 1159F PR MEDICATION LIST DOCUMENTED IN MEDICAL RECORD: ICD-10-PCS | Mod: CPTII,S$GLB,, | Performed by: STUDENT IN AN ORGANIZED HEALTH CARE EDUCATION/TRAINING PROGRAM

## 2023-12-05 PROCEDURE — 3288F PR FALLS RISK ASSESSMENT DOCUMENTED: ICD-10-PCS | Mod: CPTII,S$GLB,, | Performed by: STUDENT IN AN ORGANIZED HEALTH CARE EDUCATION/TRAINING PROGRAM

## 2023-12-05 PROCEDURE — 1160F RVW MEDS BY RX/DR IN RCRD: CPT | Mod: CPTII,S$GLB,, | Performed by: STUDENT IN AN ORGANIZED HEALTH CARE EDUCATION/TRAINING PROGRAM

## 2023-12-05 PROCEDURE — 3008F BODY MASS INDEX DOCD: CPT | Mod: CPTII,S$GLB,, | Performed by: STUDENT IN AN ORGANIZED HEALTH CARE EDUCATION/TRAINING PROGRAM

## 2023-12-05 PROCEDURE — 99999 PR PBB SHADOW E&M-EST. PATIENT-LVL II: ICD-10-PCS | Mod: PBBFAC,,, | Performed by: STUDENT IN AN ORGANIZED HEALTH CARE EDUCATION/TRAINING PROGRAM

## 2023-12-05 PROCEDURE — 99203 PR OFFICE/OUTPT VISIT, NEW, LEVL III, 30-44 MIN: ICD-10-PCS | Mod: S$GLB,,, | Performed by: STUDENT IN AN ORGANIZED HEALTH CARE EDUCATION/TRAINING PROGRAM

## 2023-12-05 PROCEDURE — 1125F AMNT PAIN NOTED PAIN PRSNT: CPT | Mod: CPTII,S$GLB,, | Performed by: STUDENT IN AN ORGANIZED HEALTH CARE EDUCATION/TRAINING PROGRAM

## 2023-12-05 PROCEDURE — 99203 OFFICE O/P NEW LOW 30 MIN: CPT | Mod: S$GLB,,, | Performed by: STUDENT IN AN ORGANIZED HEALTH CARE EDUCATION/TRAINING PROGRAM

## 2023-12-05 PROCEDURE — 1159F MED LIST DOCD IN RCRD: CPT | Mod: CPTII,S$GLB,, | Performed by: STUDENT IN AN ORGANIZED HEALTH CARE EDUCATION/TRAINING PROGRAM

## 2023-12-05 PROCEDURE — 1101F PR PT FALLS ASSESS DOC 0-1 FALLS W/OUT INJ PAST YR: ICD-10-PCS | Mod: CPTII,S$GLB,, | Performed by: STUDENT IN AN ORGANIZED HEALTH CARE EDUCATION/TRAINING PROGRAM

## 2023-12-05 PROCEDURE — 1125F PR PAIN SEVERITY QUANTIFIED, PAIN PRESENT: ICD-10-PCS | Mod: CPTII,S$GLB,, | Performed by: STUDENT IN AN ORGANIZED HEALTH CARE EDUCATION/TRAINING PROGRAM

## 2023-12-05 NOTE — PROGRESS NOTES
Subjective:      Patient ID: Yudith Haider is a 68 y.o. female.    Chief Complaint: Foot Pain and Toe Pain (R #5)    Ms. Haider presents today with right foot pain, especially the 5th toe. She bumped into a wall 2.5 weeks ago and hurt her toe very badly. She was given a surgical shoe at the urgent care and was found to have a 5th toe fracture.     Review of Systems   Musculoskeletal:         Right 5th toe pain   All other systems reviewed and are negative.          Objective:      Physical Exam  Cardiovascular:      Pulses:           Dorsalis pedis pulses are 2+ on the right side.        Posterior tibial pulses are 2+ on the right side.   Feet:      Comments: R foot:  Tenderness to palpation of the 5th toe with focal edema. Tenderness extending to the 5th MTPJ and 5th metatarsal.               Assessment:       Encounter Diagnosis   Name Primary?    Closed nondisplaced fracture of proximal phalanx of lesser toe of right foot, initial encounter Yes         Plan:       Yudith was seen today for foot pain and toe pain.    Diagnoses and all orders for this visit:    Closed nondisplaced fracture of proximal phalanx of lesser toe of right foot, initial encounter      I counseled the patient on her conditions, their implications and medical management.    Pain will improve as the toe heals. New surgical shoe was given and modified to relieve pressure at the 5th toe.   F/u as needed    Aries Castellanos DPM

## 2024-11-22 ENCOUNTER — PATIENT MESSAGE (OUTPATIENT)
Dept: PODIATRY | Facility: CLINIC | Age: 69
End: 2024-11-22
Payer: MEDICARE

## 2025-07-14 ENCOUNTER — TELEPHONE (OUTPATIENT)
Dept: PRIMARY CARE CLINIC | Facility: CLINIC | Age: 70
End: 2025-07-14
Payer: MEDICARE

## 2025-07-14 NOTE — TELEPHONE ENCOUNTER
Copied from CRM #[id number]. Topic: General Inquiry - Patient Advice  >> Jul 14, 2025 12:33 PM Ese wrote:  .1MEDICALADVICE     Patient is calling for Medical Advice regarding:Pt called and stated that she is looking to schedule as a new Pt     How long has patient had these symptoms:    Pharmacy name and phone#:    Patient wants a call back or thru [facility], provide patient's call back phone number: Call back [phone]    Comments:    Please advise patient replies from provider may take up to 48 hours.

## 2025-08-08 ENCOUNTER — OFFICE VISIT (OUTPATIENT)
Dept: PRIMARY CARE CLINIC | Facility: CLINIC | Age: 70
End: 2025-08-08
Payer: MEDICARE

## 2025-08-08 VITALS
WEIGHT: 149.25 LBS | HEART RATE: 70 BPM | DIASTOLIC BLOOD PRESSURE: 78 MMHG | BODY MASS INDEX: 26.45 KG/M2 | OXYGEN SATURATION: 97 % | SYSTOLIC BLOOD PRESSURE: 132 MMHG | TEMPERATURE: 99 F | HEIGHT: 63 IN

## 2025-08-08 DIAGNOSIS — Z01.419 ROUTINE GYNECOLOGICAL EXAMINATION: ICD-10-CM

## 2025-08-08 DIAGNOSIS — M50.30 DDD (DEGENERATIVE DISC DISEASE), CERVICAL: ICD-10-CM

## 2025-08-08 DIAGNOSIS — M19.042 PRIMARY OSTEOARTHRITIS OF LEFT HAND: ICD-10-CM

## 2025-08-08 DIAGNOSIS — I10 PRIMARY HYPERTENSION: ICD-10-CM

## 2025-08-08 DIAGNOSIS — F32.1 MAJOR DEPRESSIVE DISORDER, SINGLE EPISODE, MODERATE: ICD-10-CM

## 2025-08-08 DIAGNOSIS — E78.2 MIXED HYPERLIPIDEMIA: ICD-10-CM

## 2025-08-08 DIAGNOSIS — K58.9 IRRITABLE BOWEL SYNDROME, UNSPECIFIED TYPE: ICD-10-CM

## 2025-08-08 DIAGNOSIS — R73.09 ELEVATED GLUCOSE: ICD-10-CM

## 2025-08-08 DIAGNOSIS — E03.9 ACQUIRED HYPOTHYROIDISM: ICD-10-CM

## 2025-08-08 DIAGNOSIS — J30.9 ALLERGIC RHINITIS, UNSPECIFIED SEASONALITY, UNSPECIFIED TRIGGER: ICD-10-CM

## 2025-08-08 DIAGNOSIS — D21.10: ICD-10-CM

## 2025-08-08 DIAGNOSIS — G47.00 INSOMNIA, PERSISTENT: ICD-10-CM

## 2025-08-08 DIAGNOSIS — E78.2 MULTIPLE-TYPE HYPERLIPIDEMIA: ICD-10-CM

## 2025-08-08 DIAGNOSIS — Z76.89 ENCOUNTER TO ESTABLISH CARE: Primary | ICD-10-CM

## 2025-08-08 DIAGNOSIS — I10 BENIGN HYPERTENSION: ICD-10-CM

## 2025-08-08 PROCEDURE — 99999 PR PBB SHADOW E&M-EST. PATIENT-LVL IV: CPT | Mod: PBBFAC,,, | Performed by: INTERNAL MEDICINE

## 2025-08-08 RX ORDER — AMLODIPINE BESYLATE 5 MG/1
5 TABLET ORAL DAILY
Qty: 90 TABLET | Refills: 3 | Status: SHIPPED | OUTPATIENT
Start: 2025-08-08 | End: 2026-08-08

## 2025-08-08 NOTE — PROGRESS NOTES
"[facility] INITIAL VISIT NOTE      CHIEF COMPLAINT     Chief Complaint   Patient presents with    Establish Care     Patient is here to establish care with Dr. Palumbo. She wants to discuss her Amlodipine. She says it causes her ankles to swell.        HPI     Yudith Haider is a 69 y.o.  female who presents with a PMHx of HLD, IBS, DEPRESSION, SEASON ALLERGIC RHINITIS, ROMAN, HTN, who presents to establish care  Here alone today.  x 5 years ago. Has one daughter who lives in [place].   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   On ROS, with insomnia. Lifelong. Says she overthinks. Able to fall asleep just fine but unable to stay alseep. Goes to sleep around 8pm, wakes up around midnight to 2am, then someones not able to fall back asleep. Gets up early around 5am to go to work. Has tried melatonin and all OTC meds and prescribed medications and says they all make her sleep walk. Says she's had this trouble with sleep her whole life, even as a child.able to stay away during the day. Discussed considering a sleep study. Patient is not agreeable to this. Wishes to continue as is.         CHRONIC HEALTH ISSUES:     Depression: no medication. Describes her mood as "pretty good"  Denies SI/HI    HTN: BP today is atgoal at 132/78. Current medications are amlodipine and bisoprolol. However, she wishes to reduce the meade of amlodipine back to 5mg due to ankle swelling. Told her will do so but she needs to monitor BP and let us know if it goes above 140/90 so we may add something. As she has psoriasis, may have to avoid HCTZ.      Hypothyroidism: on synthroid 100mcg. Last TSH wnl and she is asymptomatic.         CHRONIC HEALTH ISSUES:   Past Medical History:  Past Medical History:   Diagnosis Date    Allergic rhinitis, unspecified     Arthritis     Cataract     " Cervicalgia     Cough     Diarrhea, unspecified     Essential (primary) hypertension     History of pneumonia     Hypertension     Hypothyroidism     Insomnia, unspecified     Irritable bowel syndrome with diarrhea     Irritable bowel syndrome without diarrhea     Major depressive disorder, single episode, moderate     Nausea     Other intervertebral disc degeneration, lumbar region     Other lichen planus     Other seasonal allergic rhinitis     Other specified hypothyroidism     Primary osteoarthritis of left hand     Shortness of breath     Synovitis and tenosynovitis, unspecified          Geriatric Assessment    ADLs : independent  iADLs:  independent    Polypharmacy:no    Visual impairments: no changes    Hearing impairment: no changes. No hearing loss    Urinary incontinence: no    Malnutrition: no    Gait/balance/falls: no falls.     Depression: PHQ2. No despression or anxiety    Sleep:     Cognitive Problems: minicog.3/5.     Environmental/social problems: lives alone    Functional status:     Support system:       Date: 08/08/2025    Power of   I initiated the process of voluntary advance care planning today and explained the importance of this process to the patient.  I introduced the concept of advance directives to the patient, as well. Then the patient received detailed information about the importance of designating a Health Care Power of  (HCPOA). She was also instructed to communicate with this person about their wishes for future healthcare, should she become sick and lose decision-making capacity. The patient has previously appointed a HCPOA. After our discussion, the patient has decided to complete a HCPOA and has appointed her daughter, health care agent: tucker & health care agent number: on file. I encouraged her to communicate with this person about their wishes for future healthcare, should she become sick and lose decision-making capacity.      A total of 10 min was spent on  advance care planning, goals of care discussion, emotional support, formulating and communicating prognosis and exploring burden/benefit of various approaches of treatment. This discussion occurred on a fully voluntary basis with the verbal consent of the patient and/or family.         Past Surgical History:  Past Surgical History:   Procedure Laterality Date    ARTHROPLASTY  05/23/2019    BIOPSY OF MASS OF UPPER EXTREMITY Left 12/26/2018    Procedure: BIOPSY, MASS, UPPER EXTREMITY/ dorsal wrist;  Surgeon: JOYCE Meadows MD;  Location: [facility];  Service: General;  Laterality: Left;    CARPAL TUNNEL RELEASE Bilateral     CATARACT EXTRACTION      HAND SURGERY Right 06/2018    removal of for. object    HAND SURGERY Bilateral     4x 2018-2019    HYSTERECTOMY  1981    partial    KNEE SURGERY Right 06/2024    SYNOVECTOMY OF WRIST Left 08/26/2019    Procedure: SYNOVECTOMY, WRIST;  Surgeon: JOYCE Meadows MD;  Location: [facility];  Service: Orthopedics;  Laterality: Left;    THYROIDECTOMY  1993    TRAPEZIUM RESECTION Right 05/23/2019    Procedure: RIGHT TRAPEZIECTOMY with Mini Tight Rope Suspension  Arthroplasty;  Surgeon: JOYCE Meadows MD;  Location: [facility];  Service: Orthopedics;  Laterality: Right;    TRAPEZIUM RESECTION Left 08/26/2019    Procedure: EXCISION, TRAPEZIUM/ with Tightrope suspension and arthroplasty;  Surgeon: JOYCE Meadows MD;  Location: [facility];  Service: Orthopedics;  Laterality: Left;  Left supraclavicular nerve block (single shot) done in preop.        Allergies:  Review of patient's allergies indicates:   Allergen Reactions    Lisinopril      Other reaction(s): Cough       Home Medications:  Prior to Admission medications    Medication Sig Start Date End Date Taking? Authorizing Provider   amLODIPine (NORVASC) 10 MG tablet Take 1 tablet (10 mg total) by mouth once daily. 3/24/25 3/24/26  KARTHIK Boucher FNP-C   bisoprolol (ZEBETA) 10 MG tablet Take 1 tablet (10 mg total) by  "mouth once daily. 3/24/25   KARTHIK Boucher FNP-C   levothyroxine (SYNTHROID) 100 MCG tablet Take 1 tablet (100 mcg total) by mouth before breakfast. 3/24/25 9/20/25  KARTHIK Boucher FNP-C       Family History:  Family History   Problem Relation Name Age of Onset    Hypertension Mother      Diabetes Mother      Heart disease Father      Hypertension Father      Hyperlipidemia Sister      Early death Sister      Cancer Sister          lung    Alcohol abuse Sister      Glaucoma Neg Hx      Macular degeneration Neg Hx         Social History:  Social History[1]    Review of Systems:  ROS      PHYSICAL EXAM     /78 (BP Location: Right arm, Patient Position: Sitting)   Pulse 70   Temp 98.7 °F (37.1 °C) (Oral)   Ht 5' 2.75" (1.594 m)   Wt 67.7 kg (149 lb 4 oz)   SpO2 97%   BMI 26.65 kg/m²     GEN - A+OX4, NAD   HEENT - PERRL, EOMI, OP clear. MMM.   Neck - No thyromegaly or cervical LAD. No thyroid masses felt.  CV - RRR, no m/r   Chest - CTAB, no wheezing or rhonchi  Abd - S/NT/ND/+BS.   Ext - 2+BDP and radial pulses. No LE edema.   Neuro - PERRL, EOMI, no nystagmus, eyebrow raise, facial sensation, hearing, m of mastication, smile, palatal raise, shoulder shrug, tongue protrusion symmetric and intact. 5/5 BUE and BLE strength. Sensation to light touch intact throughout. 2+ DTRs. Normal gait.   MSK - No spinal tenderness to palpation. Normal gait.   Skin - No rash.    LABS         ASSESSMENT/PLAN     Yudith Haider is a 69 y.o. female with  1. Encounter to establish care  -medications reviewed and consolidated  -reviewed PMH, medications, HCM including vaccinations.   -reviewed most recent lab work. Reordered as needed. Discussed abnormalities with patient with time allowed for questions.   -reviewed clinic policy with patient including to arrive 15 mins before appointments, labs and results including expected turn around for results.   -outside records and consultants notes reviewed as time allowed. " Updated treatment team with name of other providers.   -reviewed and confirmed patients contact information, preferred pharmacy etc.   -AVS provided after visit to summarized things discussed.   -The following assessments were completed:  Living Situation  CAGE  Depression Screening  Timed Get Up and Go  Cognitive Function Screening-Minicog   Nutrition Screening  ADL Screening      2. Acquired hypothyroidism  Overview:  7/2016: thyroidectomy at 40s. On medcine since then. 8/2016: TSH wnl .    Orders:  -     TSH; Future; Expected date: 08/08/2025    3. Allergic rhinitis, unspecified seasonality, unspecified trigger  Controlled  Avoid triggers    4. Insomnia, persistent  Overview:  12/2016: sleeping walk. melaonin ambien and trazodone. all make sleeping walk.  declines medcine.    Orders:  -     CBC Auto Differential; Future; Expected date: 08/08/2025    5. Multiple-type hyperlipidemia  Overview:  Ldl pending. Last one borderline controlled    Orders:  -     CBC Auto Differential; Future; Expected date: 08/08/2025    6. Irritable bowel syndrome, unspecified type  Overview:  on dicyclomine. follow up with Trainer. likely celiac disease. pt is on glutea free diet.      7. DDD (degenerative disc disease), cervical  Stable     8. Benign neoplasm of soft tissue of upper extremity, unspecified laterality    9. Primary osteoarthritis of left hand  Has had surgery    10. Mixed hyperlipidemia  Overview:  Ldl borderline at goal   Encouraged a low cholesterol diet and continue with excercise    Orders:  -     CBC Auto Differential; Future; Expected date: 08/08/2025  -     Lipid Panel; Future; Expected date: 08/08/2025    11. Primary hypertension  -     Comprehensive Metabolic Panel; Future; Expected date: 08/08/2025  -     TSH; Future; Expected date: 08/08/2025    12. Major depressive disorder, single episode, moderate  Overview:  Currently denies depression or symptoms. No medication on chart.   Denies SI/HI       13. Benign  hypertension  Overview:  Currently on amlodipine and bisoprolol.   Decreased dose of amlodionie to 5mg from 10mg on 25 due to ankle swelling. She will continue to monitor bp  Limit salt in diet. Admits that she has been consuming lots of salt.   Continue with excercise    Per previous PCP :2016: on batablocker.  can not tolerate ACEI and HCTZ.2016: stable . exercise daily.2016: add ccb low dosage for pt , since home BP > 140/90 sometimes , plus headache.2017:improved.    Orders:  -     amLODIPine (NORVASC) 5 MG tablet; Take 1 tablet (5 mg total) by mouth once daily.  Dispense: 90 tablet; Refill: 3    14. Routine gynecological examination  -     Ambulatory referral/consult to Gynecology; Future; Expected date: 08/15/2025    15. Elevated glucose  -     Hemoglobin A1C; Future; Expected date: 2025         ACP: discussion had about ACP to include definition of ACP, HCP, CODE STATUS. Paperwork handed to patient, to review, discuss with family and return it to clinic to be scanned into the chart.   Advance Care Planning       My total time spent on this encounter was at least 60 minutes which included  the following activities: preparing to see the patient, performing a medically appropriate and/or evaluation, counseling and educating the patient and family/caregiver, ordering medications, tests, or procedures, referring and communicating with other healthcare providers, documenting clinical information in the electronic or other health record. This time is independent and non-overlapping.         RTC in 6 months, sooner if needed and depending on labs.    Kaila Palumbo MD  Board Certified Internist/Geriatrician  [facility]           [1]   Social History  Tobacco Use    Smoking status: Former     Current packs/day: 0.00     Types: Cigarettes     Quit date: 2003     Years since quittin.6     Passive exposure: Past    Smokeless tobacco: Never    Substance Use Topics    Alcohol use: Yes     Comment: occassional    Drug use: No

## 2025-08-11 PROBLEM — Z76.89 ENCOUNTER TO ESTABLISH CARE: Status: RESOLVED | Noted: 2025-08-08 | Resolved: 2025-08-11

## 2025-08-22 ENCOUNTER — LAB VISIT (OUTPATIENT)
Dept: LAB | Facility: HOSPITAL | Age: 70
End: 2025-08-22
Attending: INTERNAL MEDICINE
Payer: MEDICARE

## 2025-08-22 DIAGNOSIS — R73.09 ELEVATED GLUCOSE: ICD-10-CM

## 2025-08-22 DIAGNOSIS — E78.2 MULTIPLE-TYPE HYPERLIPIDEMIA: ICD-10-CM

## 2025-08-22 DIAGNOSIS — E03.9 ACQUIRED HYPOTHYROIDISM: ICD-10-CM

## 2025-08-22 DIAGNOSIS — I10 PRIMARY HYPERTENSION: ICD-10-CM

## 2025-08-22 DIAGNOSIS — E78.2 MIXED HYPERLIPIDEMIA: ICD-10-CM

## 2025-08-22 DIAGNOSIS — G47.00 INSOMNIA, PERSISTENT: ICD-10-CM

## 2025-08-22 LAB
ABSOLUTE EOSINOPHIL (OHS): 0.08 K/UL
ABSOLUTE MONOCYTE (OHS): 0.28 K/UL (ref 0.3–1)
ABSOLUTE NEUTROPHIL COUNT (OHS): 2.23 K/UL (ref 1.8–7.7)
ALBUMIN SERPL BCP-MCNC: 4.5 G/DL (ref 3.5–5.2)
ALP SERPL-CCNC: 63 UNIT/L (ref 40–150)
ALT SERPL W/O P-5'-P-CCNC: 23 UNIT/L (ref 0–55)
ANION GAP (OHS): 11 MMOL/L (ref 8–16)
AST SERPL-CCNC: 22 UNIT/L (ref 0–50)
BASOPHILS # BLD AUTO: 0.04 K/UL
BASOPHILS NFR BLD AUTO: 1.1 %
BILIRUB SERPL-MCNC: 0.4 MG/DL (ref 0.1–1)
BUN SERPL-MCNC: 14 MG/DL (ref 8–23)
CALCIUM SERPL-MCNC: 9.1 MG/DL (ref 8.7–10.5)
CHLORIDE SERPL-SCNC: 106 MMOL/L (ref 95–110)
CHOLEST SERPL-MCNC: 222 MG/DL (ref 120–199)
CHOLEST/HDLC SERPL: 2.7 {RATIO} (ref 2–5)
CO2 SERPL-SCNC: 24 MMOL/L (ref 23–29)
CREAT SERPL-MCNC: 0.6 MG/DL (ref 0.5–1.4)
EAG (OHS): 103 MG/DL (ref 68–131)
ERYTHROCYTE [DISTWIDTH] IN BLOOD BY AUTOMATED COUNT: 12.2 % (ref 11.5–14.5)
GFR SERPLBLD CREATININE-BSD FMLA CKD-EPI: >60 ML/MIN/1.73/M2
GLUCOSE SERPL-MCNC: 84 MG/DL (ref 70–110)
HBA1C MFR BLD: 5.2 % (ref 4–5.6)
HCT VFR BLD AUTO: 38.8 % (ref 37–48.5)
HDLC SERPL-MCNC: 82 MG/DL (ref 40–75)
HDLC SERPL: 36.9 % (ref 20–50)
HGB BLD-MCNC: 13.1 GM/DL (ref 12–16)
IMM GRANULOCYTES # BLD AUTO: 0 K/UL (ref 0–0.04)
IMM GRANULOCYTES NFR BLD AUTO: 0 % (ref 0–0.5)
LDLC SERPL CALC-MCNC: 100.6 MG/DL (ref 63–159)
LYMPHOCYTES # BLD AUTO: 1.09 K/UL (ref 1–4.8)
MCH RBC QN AUTO: 32 PG (ref 27–31)
MCHC RBC AUTO-ENTMCNC: 33.8 G/DL (ref 32–36)
MCV RBC AUTO: 95 FL (ref 82–98)
NONHDLC SERPL-MCNC: 140 MG/DL
NUCLEATED RBC (/100WBC) (OHS): 0 /100 WBC
PLATELET # BLD AUTO: 217 K/UL (ref 150–450)
PMV BLD AUTO: 10.4 FL (ref 9.2–12.9)
POTASSIUM SERPL-SCNC: 4.5 MMOL/L (ref 3.5–5.1)
PROT SERPL-MCNC: 7.5 GM/DL (ref 6–8.4)
RBC # BLD AUTO: 4.09 M/UL (ref 4–5.4)
RELATIVE EOSINOPHIL (OHS): 2.2 %
RELATIVE LYMPHOCYTE (OHS): 29.3 % (ref 18–48)
RELATIVE MONOCYTE (OHS): 7.5 % (ref 4–15)
RELATIVE NEUTROPHIL (OHS): 59.9 % (ref 38–73)
SODIUM SERPL-SCNC: 141 MMOL/L (ref 136–145)
TRIGL SERPL-MCNC: 197 MG/DL (ref 30–150)
TSH SERPL-ACNC: 1.71 UIU/ML (ref 0.4–4)
WBC # BLD AUTO: 3.72 K/UL (ref 3.9–12.7)

## 2025-08-22 PROCEDURE — 84443 ASSAY THYROID STIM HORMONE: CPT

## 2025-08-22 PROCEDURE — 85025 COMPLETE CBC W/AUTO DIFF WBC: CPT

## 2025-08-22 PROCEDURE — 80053 COMPREHEN METABOLIC PANEL: CPT

## 2025-08-22 PROCEDURE — 80061 LIPID PANEL: CPT

## 2025-08-22 PROCEDURE — 36415 COLL VENOUS BLD VENIPUNCTURE: CPT | Mod: PO

## 2025-08-22 PROCEDURE — 83036 HEMOGLOBIN GLYCOSYLATED A1C: CPT

## (undated) DEVICE — SYR GLASS 5CC LUER LOK

## (undated) DEVICE — SYR DISP LL 5CC

## (undated) DEVICE — GLOVE SURG ULTRA TOUCH 7.5

## (undated) DEVICE — NDL TUOHY EPIDURAL 20G X 3.5

## (undated) DEVICE — SYS LABEL CORRECT MED

## (undated) DEVICE — NDL SAFETY 25G X 1.5 ECLIPSE

## (undated) DEVICE — NDL HYPODERMIC BLUNT 18G 1.5IN

## (undated) DEVICE — SYR 10CC LUER LOCK

## (undated) DEVICE — APPLICATOR CHLORAPREP CLR 10.5

## (undated) DEVICE — TUBING MINIBORE EXTENSION